# Patient Record
Sex: MALE | Race: WHITE | URBAN - METROPOLITAN AREA
[De-identification: names, ages, dates, MRNs, and addresses within clinical notes are randomized per-mention and may not be internally consistent; named-entity substitution may affect disease eponyms.]

---

## 2022-01-01 ENCOUNTER — APPOINTMENT (OUTPATIENT)
Dept: GENERAL RADIOLOGY | Facility: CLINIC | Age: 67
DRG: 003 | End: 2022-01-01
Attending: STUDENT IN AN ORGANIZED HEALTH CARE EDUCATION/TRAINING PROGRAM
Payer: COMMERCIAL

## 2022-01-01 ENCOUNTER — APPOINTMENT (OUTPATIENT)
Dept: CT IMAGING | Facility: CLINIC | Age: 67
DRG: 003 | End: 2022-01-01
Attending: PHYSICIAN ASSISTANT
Payer: COMMERCIAL

## 2022-01-01 ENCOUNTER — APPOINTMENT (OUTPATIENT)
Dept: ULTRASOUND IMAGING | Facility: CLINIC | Age: 67
DRG: 003 | End: 2022-01-01
Attending: STUDENT IN AN ORGANIZED HEALTH CARE EDUCATION/TRAINING PROGRAM
Payer: COMMERCIAL

## 2022-01-01 ENCOUNTER — HOSPITAL ENCOUNTER (INPATIENT)
Facility: CLINIC | Age: 67
LOS: 2 days | DRG: 003 | End: 2022-03-18
Attending: FAMILY MEDICINE | Admitting: INTERNAL MEDICINE
Payer: COMMERCIAL

## 2022-01-01 ENCOUNTER — APPOINTMENT (OUTPATIENT)
Dept: CT IMAGING | Facility: CLINIC | Age: 67
DRG: 003 | End: 2022-01-01
Attending: STUDENT IN AN ORGANIZED HEALTH CARE EDUCATION/TRAINING PROGRAM
Payer: COMMERCIAL

## 2022-01-01 ENCOUNTER — APPOINTMENT (OUTPATIENT)
Dept: ULTRASOUND IMAGING | Facility: CLINIC | Age: 67
DRG: 003 | End: 2022-01-01
Attending: NURSE PRACTITIONER
Payer: COMMERCIAL

## 2022-01-01 ENCOUNTER — ANESTHESIA EVENT (OUTPATIENT)
Dept: CARDIOLOGY | Facility: CLINIC | Age: 67
DRG: 003 | End: 2022-01-01
Payer: COMMERCIAL

## 2022-01-01 ENCOUNTER — APPOINTMENT (OUTPATIENT)
Dept: CARDIOLOGY | Facility: CLINIC | Age: 67
DRG: 003 | End: 2022-01-01
Attending: STUDENT IN AN ORGANIZED HEALTH CARE EDUCATION/TRAINING PROGRAM
Payer: COMMERCIAL

## 2022-01-01 ENCOUNTER — ANESTHESIA (OUTPATIENT)
Dept: CARDIOLOGY | Facility: CLINIC | Age: 67
DRG: 003 | End: 2022-01-01
Payer: COMMERCIAL

## 2022-01-01 ENCOUNTER — APPOINTMENT (OUTPATIENT)
Dept: NEUROLOGY | Facility: CLINIC | Age: 67
DRG: 003 | End: 2022-01-01
Attending: STUDENT IN AN ORGANIZED HEALTH CARE EDUCATION/TRAINING PROGRAM
Payer: COMMERCIAL

## 2022-01-01 VITALS
BODY MASS INDEX: 39.14 KG/M2 | OXYGEN SATURATION: 100 % | HEIGHT: 70 IN | WEIGHT: 273.37 LBS | RESPIRATION RATE: 18 BRPM | TEMPERATURE: 99.5 F

## 2022-01-01 DIAGNOSIS — I46.9 CARDIAC ARREST (H): ICD-10-CM

## 2022-01-01 LAB
ABO/RH(D): NORMAL
ABO/RH(D): NORMAL
ACT BLD: 165 SECONDS (ref 74–150)
ACT BLD: 173 SECONDS (ref 74–150)
ACT BLD: 177 SECONDS (ref 74–150)
ACT BLD: 177 SECONDS (ref 74–150)
ACT BLD: 186 SECONDS (ref 74–150)
ACT BLD: 194 SECONDS (ref 74–150)
ACT BLD: 199 SECONDS (ref 74–150)
ACT BLD: 203 SECONDS (ref 74–150)
ACT BLD: 207 SECONDS (ref 74–150)
ACT BLD: 207 SECONDS (ref 74–150)
ACT BLD: 211 SECONDS (ref 74–150)
ACT BLD: 216 SECONDS (ref 74–150)
ACT BLD: 224 SECONDS (ref 74–150)
ACT BLD: 237 SECONDS (ref 74–150)
ACT BLD: 237 SECONDS (ref 74–150)
ALBUMIN SERPL-MCNC: 2.8 G/DL (ref 3.4–5)
ALBUMIN SERPL-MCNC: 2.9 G/DL (ref 3.4–5)
ALBUMIN SERPL-MCNC: 2.9 G/DL (ref 3.4–5)
ALBUMIN SERPL-MCNC: 3 G/DL (ref 3.4–5)
ALBUMIN SERPL-MCNC: 3.2 G/DL (ref 3.4–5)
ALBUMIN SERPL-MCNC: 3.3 G/DL (ref 3.4–5)
ALBUMIN SERPL-MCNC: 3.3 G/DL (ref 3.4–5)
ALBUMIN SERPL-MCNC: 3.5 G/DL (ref 3.4–5)
ALBUMIN SERPL-MCNC: 3.5 G/DL (ref 3.4–5)
ALBUMIN SERPL-MCNC: 3.6 G/DL (ref 3.4–5)
ALBUMIN UR-MCNC: 50 MG/DL
ALP SERPL-CCNC: 118 U/L (ref 40–150)
ALP SERPL-CCNC: 136 U/L (ref 40–150)
ALP SERPL-CCNC: 151 U/L (ref 40–150)
ALP SERPL-CCNC: 68 U/L (ref 40–150)
ALP SERPL-CCNC: 71 U/L (ref 40–150)
ALP SERPL-CCNC: 72 U/L (ref 40–150)
ALP SERPL-CCNC: 77 U/L (ref 40–150)
ALP SERPL-CCNC: 93 U/L (ref 40–150)
ALT SERPL W P-5'-P-CCNC: 290 U/L (ref 0–70)
ALT SERPL W P-5'-P-CCNC: 290 U/L (ref 0–70)
ALT SERPL W P-5'-P-CCNC: 2912 U/L (ref 0–70)
ALT SERPL W P-5'-P-CCNC: 310 U/L (ref 0–70)
ALT SERPL W P-5'-P-CCNC: 367 U/L (ref 0–70)
ALT SERPL W P-5'-P-CCNC: 470 U/L (ref 0–70)
ALT SERPL W P-5'-P-CCNC: 6201 U/L (ref 0–70)
ALT SERPL W P-5'-P-CCNC: 952 U/L (ref 0–70)
AMPHETAMINES UR QL SCN: NORMAL
AMYLASE SERPL-CCNC: 209 U/L (ref 30–110)
ANION GAP SERPL CALCULATED.3IONS-SCNC: 11 MMOL/L (ref 3–14)
ANION GAP SERPL CALCULATED.3IONS-SCNC: 11 MMOL/L (ref 3–14)
ANION GAP SERPL CALCULATED.3IONS-SCNC: 13 MMOL/L (ref 3–14)
ANION GAP SERPL CALCULATED.3IONS-SCNC: 15 MMOL/L (ref 3–14)
ANION GAP SERPL CALCULATED.3IONS-SCNC: 16 MMOL/L (ref 3–14)
ANION GAP SERPL CALCULATED.3IONS-SCNC: 16 MMOL/L (ref 3–14)
ANION GAP SERPL CALCULATED.3IONS-SCNC: 17 MMOL/L (ref 3–14)
ANION GAP SERPL CALCULATED.3IONS-SCNC: 22 MMOL/L (ref 3–14)
ANION GAP SERPL CALCULATED.3IONS-SCNC: 24 MMOL/L (ref 3–14)
ANION GAP SERPL CALCULATED.3IONS-SCNC: 8 MMOL/L (ref 3–14)
ANTIBODY SCREEN: NEGATIVE
ANTIBODY SCREEN: NEGATIVE
APPEARANCE UR: ABNORMAL
APTT PPP: 105 SECONDS (ref 22–38)
APTT PPP: 113 SECONDS (ref 22–38)
APTT PPP: 158 SECONDS (ref 22–38)
APTT PPP: 48 SECONDS (ref 22–38)
APTT PPP: 55 SECONDS (ref 22–38)
APTT PPP: 63 SECONDS (ref 22–38)
APTT PPP: 79 SECONDS (ref 22–38)
APTT PPP: 90 SECONDS (ref 22–38)
AST SERPL W P-5'-P-CCNC: 1579 U/L (ref 0–45)
AST SERPL W P-5'-P-CCNC: 3927 U/L (ref 0–45)
AST SERPL W P-5'-P-CCNC: 693 U/L (ref 0–45)
AST SERPL W P-5'-P-CCNC: 694 U/L (ref 0–45)
AST SERPL W P-5'-P-CCNC: 713 U/L (ref 0–45)
AST SERPL W P-5'-P-CCNC: 752 U/L (ref 0–45)
AST SERPL W P-5'-P-CCNC: 797 U/L (ref 0–45)
AST SERPL W P-5'-P-CCNC: 9096 U/L (ref 0–45)
AT III ACT/NOR PPP CHRO: 56 % (ref 85–135)
AT III ACT/NOR PPP CHRO: 81 % (ref 85–135)
AT III ACT/NOR PPP CHRO: 95 % (ref 85–135)
ATRIAL RATE - MUSE: 70 BPM
BACTERIA UR CULT: NO GROWTH
BARBITURATES UR QL: NORMAL
BASE EXCESS BLDA CALC-SCNC: -10.2 MMOL/L (ref -9–1.8)
BASE EXCESS BLDA CALC-SCNC: -10.3 MMOL/L (ref -9–1.8)
BASE EXCESS BLDA CALC-SCNC: -10.4 MMOL/L (ref -9–1.8)
BASE EXCESS BLDA CALC-SCNC: -10.7 MMOL/L (ref -9.6–2)
BASE EXCESS BLDA CALC-SCNC: -10.8 MMOL/L (ref -9–1.8)
BASE EXCESS BLDA CALC-SCNC: -10.8 MMOL/L (ref -9–1.8)
BASE EXCESS BLDA CALC-SCNC: -11.4 MMOL/L (ref -9–1.8)
BASE EXCESS BLDA CALC-SCNC: -11.5 MMOL/L (ref -9.6–2)
BASE EXCESS BLDA CALC-SCNC: -12.3 MMOL/L (ref -9–1.8)
BASE EXCESS BLDA CALC-SCNC: -12.3 MMOL/L (ref -9–1.8)
BASE EXCESS BLDA CALC-SCNC: -12.5 MMOL/L (ref -9–1.8)
BASE EXCESS BLDA CALC-SCNC: -12.6 MMOL/L (ref -9–1.8)
BASE EXCESS BLDA CALC-SCNC: -12.6 MMOL/L (ref -9–1.8)
BASE EXCESS BLDA CALC-SCNC: -12.7 MMOL/L (ref -9–1.8)
BASE EXCESS BLDA CALC-SCNC: -12.8 MMOL/L (ref -9–1.8)
BASE EXCESS BLDA CALC-SCNC: -13 MMOL/L (ref -9–1.8)
BASE EXCESS BLDA CALC-SCNC: -13.3 MMOL/L (ref -9–1.8)
BASE EXCESS BLDA CALC-SCNC: -13.3 MMOL/L (ref -9–1.8)
BASE EXCESS BLDA CALC-SCNC: -13.9 MMOL/L (ref -9–1.8)
BASE EXCESS BLDA CALC-SCNC: -14 MMOL/L (ref -9–1.8)
BASE EXCESS BLDA CALC-SCNC: -14 MMOL/L (ref -9–1.8)
BASE EXCESS BLDA CALC-SCNC: -14.2 MMOL/L (ref -9–1.8)
BASE EXCESS BLDA CALC-SCNC: -14.2 MMOL/L (ref -9–1.8)
BASE EXCESS BLDA CALC-SCNC: -15.4 MMOL/L (ref -9–1.8)
BASE EXCESS BLDA CALC-SCNC: -16.2 MMOL/L (ref -9.6–2)
BASE EXCESS BLDA CALC-SCNC: -16.7 MMOL/L (ref -9–1.8)
BASE EXCESS BLDA CALC-SCNC: -17 MMOL/L (ref -9.6–2)
BASE EXCESS BLDA CALC-SCNC: -17.9 MMOL/L (ref -9–1.8)
BASE EXCESS BLDA CALC-SCNC: -18.5 MMOL/L (ref -9–1.8)
BASE EXCESS BLDA CALC-SCNC: -8.3 MMOL/L (ref -9–1.8)
BASE EXCESS BLDV CALC-SCNC: -12.2 MMOL/L (ref -7.7–1.9)
BASE EXCESS BLDV CALC-SCNC: -13.2 MMOL/L (ref -7.7–1.9)
BASE EXCESS BLDV CALC-SCNC: -9.2 MMOL/L (ref -7.7–1.9)
BASOPHILS # BLD AUTO: 0.1 10E3/UL (ref 0–0.2)
BASOPHILS NFR BLD AUTO: 0 %
BENZODIAZ UR QL: NORMAL
BILIRUB DIRECT SERPL-MCNC: 0.8 MG/DL (ref 0–0.2)
BILIRUB SERPL-MCNC: 0.7 MG/DL (ref 0.2–1.3)
BILIRUB SERPL-MCNC: 0.9 MG/DL (ref 0.2–1.3)
BILIRUB SERPL-MCNC: 1 MG/DL (ref 0.2–1.3)
BILIRUB SERPL-MCNC: 1.1 MG/DL (ref 0.2–1.3)
BILIRUB SERPL-MCNC: 1.1 MG/DL (ref 0.2–1.3)
BILIRUB SERPL-MCNC: 1.6 MG/DL (ref 0.2–1.3)
BILIRUB SERPL-MCNC: 1.6 MG/DL (ref 0.2–1.3)
BILIRUB SERPL-MCNC: 1.9 MG/DL (ref 0.2–1.3)
BILIRUB UR QL STRIP: NEGATIVE
BLD PROD TYP BPU: NORMAL
BLOOD COMPONENT TYPE: NORMAL
BUN SERPL-MCNC: 28 MG/DL (ref 7–30)
BUN SERPL-MCNC: 32 MG/DL (ref 7–30)
BUN SERPL-MCNC: 32 MG/DL (ref 7–30)
BUN SERPL-MCNC: 36 MG/DL (ref 7–30)
BUN SERPL-MCNC: 38 MG/DL (ref 7–30)
BUN SERPL-MCNC: 39 MG/DL (ref 7–30)
BUN SERPL-MCNC: 42 MG/DL (ref 7–30)
BUN SERPL-MCNC: 43 MG/DL (ref 7–30)
BUN SERPL-MCNC: 43 MG/DL (ref 7–30)
BUN SERPL-MCNC: 46 MG/DL (ref 7–30)
CA-I BLD-MCNC: 3.3 MG/DL (ref 4.4–5.2)
CA-I BLD-MCNC: 3.9 MG/DL (ref 4.4–5.2)
CA-I BLD-MCNC: 3.9 MG/DL (ref 4.4–5.2)
CA-I BLD-MCNC: 4 MG/DL (ref 4.4–5.2)
CA-I BLD-MCNC: 4.1 MG/DL (ref 4.4–5.2)
CA-I BLD-MCNC: 4.2 MG/DL (ref 4.4–5.2)
CA-I BLD-MCNC: 4.2 MG/DL (ref 4.4–5.2)
CA-I BLD-MCNC: 4.3 MG/DL (ref 4.4–5.2)
CA-I BLD-MCNC: 4.4 MG/DL (ref 4.4–5.2)
CA-I BLD-MCNC: 4.4 MG/DL (ref 4.4–5.2)
CA-I BLD-MCNC: 4.5 MG/DL (ref 4.4–5.2)
CA-I BLD-MCNC: 4.5 MG/DL (ref 4.4–5.2)
CA-I BLD-MCNC: 4.8 MG/DL (ref 4.4–5.2)
CA-I BLD-MCNC: 4.8 MG/DL (ref 4.4–5.2)
CALCIUM SERPL-MCNC: 7.1 MG/DL (ref 8.5–10.1)
CALCIUM SERPL-MCNC: 7.1 MG/DL (ref 8.5–10.1)
CALCIUM SERPL-MCNC: 7.4 MG/DL (ref 8.5–10.1)
CALCIUM SERPL-MCNC: 7.4 MG/DL (ref 8.5–10.1)
CALCIUM SERPL-MCNC: 7.5 MG/DL (ref 8.5–10.1)
CALCIUM SERPL-MCNC: 7.5 MG/DL (ref 8.5–10.1)
CALCIUM SERPL-MCNC: 8 MG/DL (ref 8.5–10.1)
CALCIUM SERPL-MCNC: 8.2 MG/DL (ref 8.5–10.1)
CALCIUM SERPL-MCNC: 8.2 MG/DL (ref 8.5–10.1)
CALCIUM SERPL-MCNC: 8.8 MG/DL (ref 8.5–10.1)
CANNABINOIDS UR QL SCN: NORMAL
CF REDUC 30M P MA P HEP LENFR BLD TEG: 0 % (ref 0–8)
CF REDUC 60M P MA P HEPASE LENFR BLD TEG: 0 % (ref 0–15)
CFT P HPASE BLD TEG: 1.6 MINUTE (ref 1–3)
CFT P HPASE BLD TEG: 2.1 MINUTE (ref 1–3)
CFT P HPASE BLD TEG: 2.2 MINUTE (ref 1–3)
CHLORIDE BLD-SCNC: 104 MMOL/L (ref 94–109)
CHLORIDE BLD-SCNC: 105 MMOL/L (ref 94–109)
CHLORIDE BLD-SCNC: 108 MMOL/L (ref 94–109)
CHLORIDE BLD-SCNC: 110 MMOL/L (ref 94–109)
CHLORIDE BLD-SCNC: 112 MMOL/L (ref 94–109)
CHLORIDE BLD-SCNC: 113 MMOL/L (ref 94–109)
CHLORIDE BLD-SCNC: 114 MMOL/L (ref 94–109)
CHLORIDE BLD-SCNC: 115 MMOL/L (ref 94–109)
CHLORIDE BLD-SCNC: 116 MMOL/L (ref 94–109)
CHLORIDE BLD-SCNC: 116 MMOL/L (ref 94–109)
CI (COAGULATION INDEX)(Z): -0.5 (ref -3–3)
CI (COAGULATION INDEX)(Z): -1.5 (ref -3–3)
CI (COAGULATION INDEX)(Z): -3.5 (ref -3–3)
CLOT ANGLE P HPASE BLD TEG: 60.3 DEGREES (ref 53–72)
CLOT ANGLE P HPASE BLD TEG: 60.5 DEGREES (ref 53–72)
CLOT ANGLE P HPASE BLD TEG: 66.8 DEGREES (ref 53–72)
CLOT INIT P HPASE BLD TEG: 11.5 MINUTE (ref 5–10)
CLOT INIT P HPASE BLD TEG: 8.1 MINUTE (ref 5–10)
CLOT INIT P HPASE BLD TEG: 8.8 MINUTE (ref 5–10)
CLOT STRENGTH P HPASE BLD TEG: 10.4 KD/SC (ref 4.5–11)
CLOT STRENGTH P HPASE BLD TEG: 9.5 KD/SC (ref 4.5–11)
CLOT STRENGTH P HPASE BLD TEG: 9.9 KD/SC (ref 4.5–11)
CO2 SERPL-SCNC: 12 MMOL/L (ref 20–32)
CO2 SERPL-SCNC: 13 MMOL/L (ref 20–32)
CO2 SERPL-SCNC: 15 MMOL/L (ref 20–32)
CO2 SERPL-SCNC: 16 MMOL/L (ref 20–32)
CO2 SERPL-SCNC: 16 MMOL/L (ref 20–32)
CO2 SERPL-SCNC: 17 MMOL/L (ref 20–32)
CO2 SERPL-SCNC: 19 MMOL/L (ref 20–32)
CO2 SERPL-SCNC: 19 MMOL/L (ref 20–32)
COCAINE UR QL: NORMAL
CODING SYSTEM: NORMAL
COLOR UR AUTO: ABNORMAL
CORTIS SERPL-MCNC: 42.1 UG/DL (ref 4–22)
CPB POCT: NO
CREAT SERPL-MCNC: 1.42 MG/DL (ref 0.66–1.25)
CREAT SERPL-MCNC: 1.59 MG/DL (ref 0.66–1.25)
CREAT SERPL-MCNC: 1.9 MG/DL (ref 0.66–1.25)
CREAT SERPL-MCNC: 2.09 MG/DL (ref 0.66–1.25)
CREAT SERPL-MCNC: 2.11 MG/DL (ref 0.66–1.25)
CREAT SERPL-MCNC: 2.16 MG/DL (ref 0.66–1.25)
CREAT SERPL-MCNC: 2.27 MG/DL (ref 0.66–1.25)
CREAT SERPL-MCNC: 2.29 MG/DL (ref 0.66–1.25)
CREAT SERPL-MCNC: 2.44 MG/DL (ref 0.66–1.25)
CREAT SERPL-MCNC: 2.53 MG/DL (ref 0.66–1.25)
CROSSMATCH: NORMAL
CRP SERPL-MCNC: 130 MG/L (ref 0–8)
CRP SERPL-MCNC: 3 MG/L (ref 0–8)
CRP SERPL-MCNC: <2.9 MG/L (ref 0–8)
D DIMER PPP FEU-MCNC: 3.25 UG/ML FEU (ref 0–0.5)
D DIMER PPP FEU-MCNC: 3.78 UG/ML FEU (ref 0–0.5)
D DIMER PPP FEU-MCNC: 5.69 UG/ML FEU (ref 0–0.5)
D DIMER PPP FEU-MCNC: 7.66 UG/ML FEU (ref 0–0.5)
DIASTOLIC BLOOD PRESSURE - MUSE: NORMAL MMHG
EOSINOPHIL # BLD AUTO: 0.1 10E3/UL (ref 0–0.7)
EOSINOPHIL NFR BLD AUTO: 1 %
ERYTHROCYTE [DISTWIDTH] IN BLOOD BY AUTOMATED COUNT: 13.7 % (ref 10–15)
ERYTHROCYTE [DISTWIDTH] IN BLOOD BY AUTOMATED COUNT: 13.8 % (ref 10–15)
ERYTHROCYTE [DISTWIDTH] IN BLOOD BY AUTOMATED COUNT: 13.9 % (ref 10–15)
ERYTHROCYTE [DISTWIDTH] IN BLOOD BY AUTOMATED COUNT: 14 % (ref 10–15)
ERYTHROCYTE [DISTWIDTH] IN BLOOD BY AUTOMATED COUNT: 14 % (ref 10–15)
ERYTHROCYTE [DISTWIDTH] IN BLOOD BY AUTOMATED COUNT: 14.1 % (ref 10–15)
ERYTHROCYTE [DISTWIDTH] IN BLOOD BY AUTOMATED COUNT: 14.1 % (ref 10–15)
ERYTHROCYTE [DISTWIDTH] IN BLOOD BY AUTOMATED COUNT: 14.3 % (ref 10–15)
ERYTHROCYTE [DISTWIDTH] IN BLOOD BY AUTOMATED COUNT: 14.3 % (ref 10–15)
ERYTHROCYTE [SEDIMENTATION RATE] IN BLOOD BY WESTERGREN METHOD: 5 MM/HR (ref 0–20)
ERYTHROCYTE [SEDIMENTATION RATE] IN BLOOD BY WESTERGREN METHOD: 6 MM/HR (ref 0–20)
ERYTHROCYTE [SEDIMENTATION RATE] IN BLOOD BY WESTERGREN METHOD: 8 MM/HR (ref 0–20)
ETHANOL UR QL SCN: NORMAL
FERRITIN SERPL-MCNC: ABNORMAL NG/ML (ref 26–388)
FIBRINOGEN PPP-MCNC: 265 MG/DL (ref 170–490)
FIBRINOGEN PPP-MCNC: 271 MG/DL (ref 170–490)
FIBRINOGEN PPP-MCNC: 282 MG/DL (ref 170–490)
FIBRINOGEN PPP-MCNC: 323 MG/DL (ref 170–490)
FLUAV RNA SPEC QL NAA+PROBE: NEGATIVE
FLUBV RNA RESP QL NAA+PROBE: NEGATIVE
GFR SERPL CREATININE-BSD FRML MDRD: 27 ML/MIN/1.73M2
GFR SERPL CREATININE-BSD FRML MDRD: 28 ML/MIN/1.73M2
GFR SERPL CREATININE-BSD FRML MDRD: 30 ML/MIN/1.73M2
GFR SERPL CREATININE-BSD FRML MDRD: 31 ML/MIN/1.73M2
GFR SERPL CREATININE-BSD FRML MDRD: 33 ML/MIN/1.73M2
GFR SERPL CREATININE-BSD FRML MDRD: 33 ML/MIN/1.73M2
GFR SERPL CREATININE-BSD FRML MDRD: 34 ML/MIN/1.73M2
GFR SERPL CREATININE-BSD FRML MDRD: 38 ML/MIN/1.73M2
GFR SERPL CREATININE-BSD FRML MDRD: 47 ML/MIN/1.73M2
GFR SERPL CREATININE-BSD FRML MDRD: 54 ML/MIN/1.73M2
GLUCOSE BLD-MCNC: 103 MG/DL (ref 70–99)
GLUCOSE BLD-MCNC: 105 MG/DL (ref 70–99)
GLUCOSE BLD-MCNC: 112 MG/DL (ref 70–99)
GLUCOSE BLD-MCNC: 117 MG/DL (ref 70–99)
GLUCOSE BLD-MCNC: 119 MG/DL (ref 70–99)
GLUCOSE BLD-MCNC: 119 MG/DL (ref 70–99)
GLUCOSE BLD-MCNC: 128 MG/DL (ref 70–99)
GLUCOSE BLD-MCNC: 134 MG/DL (ref 70–99)
GLUCOSE BLD-MCNC: 170 MG/DL (ref 70–99)
GLUCOSE BLD-MCNC: 178 MG/DL (ref 70–99)
GLUCOSE BLD-MCNC: 207 MG/DL (ref 70–99)
GLUCOSE BLD-MCNC: 22 MG/DL (ref 70–99)
GLUCOSE BLD-MCNC: 230 MG/DL (ref 70–99)
GLUCOSE BLD-MCNC: 265 MG/DL (ref 70–99)
GLUCOSE BLD-MCNC: 278 MG/DL (ref 70–99)
GLUCOSE BLD-MCNC: 336 MG/DL (ref 70–99)
GLUCOSE BLD-MCNC: 58 MG/DL (ref 70–99)
GLUCOSE BLD-MCNC: 59 MG/DL (ref 70–99)
GLUCOSE BLD-MCNC: 63 MG/DL (ref 70–99)
GLUCOSE BLD-MCNC: 67 MG/DL (ref 70–99)
GLUCOSE BLD-MCNC: 82 MG/DL (ref 70–99)
GLUCOSE BLD-MCNC: 98 MG/DL (ref 70–99)
GLUCOSE BLDC GLUCOMTR-MCNC: 104 MG/DL (ref 70–99)
GLUCOSE BLDC GLUCOMTR-MCNC: 120 MG/DL (ref 70–99)
GLUCOSE BLDC GLUCOMTR-MCNC: 121 MG/DL (ref 70–99)
GLUCOSE BLDC GLUCOMTR-MCNC: 129 MG/DL (ref 70–99)
GLUCOSE BLDC GLUCOMTR-MCNC: 135 MG/DL (ref 70–99)
GLUCOSE BLDC GLUCOMTR-MCNC: 136 MG/DL (ref 70–99)
GLUCOSE BLDC GLUCOMTR-MCNC: 138 MG/DL (ref 70–99)
GLUCOSE BLDC GLUCOMTR-MCNC: 141 MG/DL (ref 70–99)
GLUCOSE BLDC GLUCOMTR-MCNC: 17 MG/DL (ref 70–99)
GLUCOSE BLDC GLUCOMTR-MCNC: 178 MG/DL (ref 70–99)
GLUCOSE BLDC GLUCOMTR-MCNC: 18 MG/DL (ref 70–99)
GLUCOSE BLDC GLUCOMTR-MCNC: 216 MG/DL (ref 70–99)
GLUCOSE BLDC GLUCOMTR-MCNC: 60 MG/DL (ref 70–99)
GLUCOSE BLDC GLUCOMTR-MCNC: 66 MG/DL (ref 70–99)
GLUCOSE BLDC GLUCOMTR-MCNC: 72 MG/DL (ref 70–99)
GLUCOSE BLDC GLUCOMTR-MCNC: 73 MG/DL (ref 70–99)
GLUCOSE BLDC GLUCOMTR-MCNC: 75 MG/DL (ref 70–99)
GLUCOSE BLDC GLUCOMTR-MCNC: 76 MG/DL (ref 70–99)
GLUCOSE BLDC GLUCOMTR-MCNC: 83 MG/DL (ref 70–99)
GLUCOSE BLDC GLUCOMTR-MCNC: 83 MG/DL (ref 70–99)
GLUCOSE BLDC GLUCOMTR-MCNC: 92 MG/DL (ref 70–99)
GLUCOSE BLDC GLUCOMTR-MCNC: 99 MG/DL (ref 70–99)
GLUCOSE UR STRIP-MCNC: NEGATIVE MG/DL
HBA1C MFR BLD: 5.9 % (ref 0–5.6)
HCO3 BLD-SCNC: 10 MMOL/L (ref 21–28)
HCO3 BLD-SCNC: 10 MMOL/L (ref 21–28)
HCO3 BLD-SCNC: 12 MMOL/L (ref 21–28)
HCO3 BLD-SCNC: 13 MMOL/L (ref 21–28)
HCO3 BLD-SCNC: 14 MMOL/L (ref 21–28)
HCO3 BLD-SCNC: 15 MMOL/L (ref 21–28)
HCO3 BLD-SCNC: 16 MMOL/L (ref 21–28)
HCO3 BLD-SCNC: 17 MMOL/L (ref 21–28)
HCO3 BLD-SCNC: 17 MMOL/L (ref 21–28)
HCO3 BLD-SCNC: 19 MMOL/L (ref 21–28)
HCO3 BLD-SCNC: 20 MMOL/L (ref 21–28)
HCO3 BLDA-SCNC: 13 MMOL/L (ref 21–28)
HCO3 BLDA-SCNC: 14 MMOL/L (ref 21–28)
HCO3 BLDA-SCNC: 15 MMOL/L (ref 21–28)
HCO3 BLDA-SCNC: 15 MMOL/L (ref 21–28)
HCO3 BLDA-SCNC: 17 MMOL/L (ref 21–28)
HCO3 BLDA-SCNC: 18 MMOL/L (ref 21–28)
HCO3 BLDA-SCNC: 19 MMOL/L (ref 21–28)
HCO3 BLDV-SCNC: 16 MMOL/L (ref 21–28)
HCO3 BLDV-SCNC: 17 MMOL/L (ref 21–28)
HCO3 BLDV-SCNC: 21 MMOL/L (ref 21–28)
HCO3 BLDV-SCNC: 24 MMOL/L (ref 21–28)
HCO3 BLDV-SCNC: 25 MMOL/L (ref 21–28)
HCT VFR BLD AUTO: 24.7 % (ref 40–53)
HCT VFR BLD AUTO: 28.4 % (ref 40–53)
HCT VFR BLD AUTO: 34.2 % (ref 40–53)
HCT VFR BLD AUTO: 34.2 % (ref 40–53)
HCT VFR BLD AUTO: 36.1 % (ref 40–53)
HCT VFR BLD AUTO: 36.7 % (ref 40–53)
HCT VFR BLD AUTO: 36.8 % (ref 40–53)
HCT VFR BLD AUTO: 41.1 % (ref 40–53)
HCT VFR BLD AUTO: 47.5 % (ref 40–53)
HCT VFR BLD CALC: 37 % (ref 40–53)
HGB BLD-MCNC: 10.4 G/DL (ref 13.3–17.7)
HGB BLD-MCNC: 10.4 G/DL (ref 13.3–17.7)
HGB BLD-MCNC: 10.6 G/DL (ref 13.3–17.7)
HGB BLD-MCNC: 11.4 G/DL (ref 13.3–17.7)
HGB BLD-MCNC: 11.8 G/DL (ref 13.3–17.7)
HGB BLD-MCNC: 12.6 G/DL (ref 13.3–17.7)
HGB BLD-MCNC: 12.8 G/DL (ref 13.3–17.7)
HGB BLD-MCNC: 13.1 G/DL (ref 13.3–17.7)
HGB BLD-MCNC: 14.4 G/DL (ref 13.3–17.7)
HGB BLD-MCNC: 15.2 G/DL (ref 13.3–17.7)
HGB BLD-MCNC: 7.3 G/DL (ref 13.3–17.7)
HGB BLD-MCNC: 8.5 G/DL (ref 13.3–17.7)
HGB FREE PLAS-MCNC: 30 MG/DL
HGB FREE PLAS-MCNC: 30 MG/DL
HGB FREE PLAS-MCNC: 50 MG/DL
HGB UR QL STRIP: ABNORMAL
HOLD SPECIMEN: NORMAL
HYALINE CASTS: 13 /LPF
IMM GRANULOCYTES # BLD: 0.2 10E3/UL
IMM GRANULOCYTES NFR BLD: 1 %
INR PPP: 1.06 (ref 0.85–1.15)
INR PPP: 1.22 (ref 0.85–1.15)
INR PPP: 1.28 (ref 0.85–1.15)
INR PPP: 1.44 (ref 0.85–1.15)
INR PPP: 1.46 (ref 0.85–1.15)
INR PPP: 1.52 (ref 0.85–1.15)
INR PPP: 1.8 (ref 0.85–1.15)
INR PPP: 2.78 (ref 0.85–1.15)
INTERPRETATION ECG - MUSE: NORMAL
INTERPRETATION TEGPIA: NORMAL
INTERPRETATION TEGPIA: NORMAL
ISSUE DATE AND TIME: NORMAL
KETONES BLD-SCNC: 0.2 MMOL/L (ref 0–0.6)
KETONES UR STRIP-MCNC: NEGATIVE MG/DL
LACTATE BLD-SCNC: 7.4 MMOL/L
LACTATE BLD-SCNC: 7.5 MMOL/L
LACTATE BLD-SCNC: 8.7 MMOL/L
LACTATE BLD-SCNC: 8.9 MMOL/L
LACTATE BLD-SCNC: 9.3 MMOL/L
LACTATE SERPL-SCNC: 11 MMOL/L (ref 0.7–2)
LACTATE SERPL-SCNC: 12.3 MMOL/L (ref 0.7–2)
LACTATE SERPL-SCNC: 13.4 MMOL/L (ref 0.7–2)
LACTATE SERPL-SCNC: 14.2 MMOL/L (ref 0.7–2)
LACTATE SERPL-SCNC: 17 MMOL/L (ref 0.7–2)
LACTATE SERPL-SCNC: 20 MMOL/L (ref 0.7–2)
LACTATE SERPL-SCNC: 21 MMOL/L (ref 0.7–2)
LACTATE SERPL-SCNC: 3.2 MMOL/L (ref 0.7–2)
LACTATE SERPL-SCNC: 3.9 MMOL/L (ref 0.7–2)
LACTATE SERPL-SCNC: 5.7 MMOL/L (ref 0.7–2)
LACTATE SERPL-SCNC: 6 MMOL/L (ref 0.7–2)
LACTATE SERPL-SCNC: 6.6 MMOL/L (ref 0.7–2)
LACTATE SERPL-SCNC: 7.4 MMOL/L (ref 0.7–2)
LACTATE SERPL-SCNC: 7.9 MMOL/L (ref 0.7–2)
LACTATE SERPL-SCNC: 8 MMOL/L (ref 0.7–2)
LACTATE SERPL-SCNC: 9.2 MMOL/L (ref 0.7–2)
LACTATE SERPL-SCNC: 9.7 MMOL/L (ref 0.7–2)
LACTATE SERPL-SCNC: NORMAL MMOL/L
LDH SERPL L TO P-CCNC: 1061 U/L (ref 85–227)
LDH SERPL L TO P-CCNC: 3509 U/L (ref 85–227)
LDH SERPL L TO P-CCNC: 934 U/L (ref 85–227)
LEUKOCYTE ESTERASE UR QL STRIP: NEGATIVE
LIPASE SERPL-CCNC: 238 U/L (ref 73–393)
LYMPHOCYTES # BLD AUTO: 2.1 10E3/UL (ref 0.8–5.3)
LYMPHOCYTES NFR BLD AUTO: 12 %
MAGNESIUM SERPL-MCNC: 2 MG/DL (ref 1.6–2.3)
MAGNESIUM SERPL-MCNC: 2 MG/DL (ref 1.6–2.3)
MAGNESIUM SERPL-MCNC: 2.1 MG/DL (ref 1.6–2.3)
MAGNESIUM SERPL-MCNC: 2.2 MG/DL (ref 1.6–2.3)
MAGNESIUM SERPL-MCNC: 2.3 MG/DL (ref 1.6–2.3)
MAGNESIUM SERPL-MCNC: 2.4 MG/DL (ref 1.6–2.3)
MAGNESIUM SERPL-MCNC: 2.5 MG/DL (ref 1.6–2.3)
MAGNESIUM SERPL-MCNC: 2.7 MG/DL (ref 1.6–2.3)
MAGNESIUM SERPL-MCNC: 2.8 MG/DL (ref 1.6–2.3)
MCF P HPASE BLD TEG: 65.5 MM (ref 50–70)
MCF P HPASE BLD TEG: 66.4 MM (ref 50–70)
MCF P HPASE BLD TEG: 67.6 MM (ref 50–70)
MCH RBC QN AUTO: 30.1 PG (ref 26.5–33)
MCH RBC QN AUTO: 30.4 PG (ref 26.5–33)
MCH RBC QN AUTO: 30.5 PG (ref 26.5–33)
MCH RBC QN AUTO: 30.6 PG (ref 26.5–33)
MCH RBC QN AUTO: 30.8 PG (ref 26.5–33)
MCH RBC QN AUTO: 30.8 PG (ref 26.5–33)
MCH RBC QN AUTO: 30.9 PG (ref 26.5–33)
MCHC RBC AUTO-ENTMCNC: 29.6 G/DL (ref 31.5–36.5)
MCHC RBC AUTO-ENTMCNC: 29.9 G/DL (ref 31.5–36.5)
MCHC RBC AUTO-ENTMCNC: 30.4 G/DL (ref 31.5–36.5)
MCHC RBC AUTO-ENTMCNC: 31 G/DL (ref 31.5–36.5)
MCHC RBC AUTO-ENTMCNC: 31 G/DL (ref 31.5–36.5)
MCHC RBC AUTO-ENTMCNC: 31.1 G/DL (ref 31.5–36.5)
MCHC RBC AUTO-ENTMCNC: 31.1 G/DL (ref 31.5–36.5)
MCHC RBC AUTO-ENTMCNC: 31.6 G/DL (ref 31.5–36.5)
MCHC RBC AUTO-ENTMCNC: 32 G/DL (ref 31.5–36.5)
MCV RBC AUTO: 101 FL (ref 78–100)
MCV RBC AUTO: 104 FL (ref 78–100)
MCV RBC AUTO: 96 FL (ref 78–100)
MCV RBC AUTO: 98 FL (ref 78–100)
MCV RBC AUTO: 99 FL (ref 78–100)
MCV RBC AUTO: 99 FL (ref 78–100)
MONOCYTES # BLD AUTO: 0.5 10E3/UL (ref 0–1.3)
MONOCYTES NFR BLD AUTO: 3 %
MRSA DNA SPEC QL NAA+PROBE: NEGATIVE
MUCOUS THREADS #/AREA URNS LPF: PRESENT /LPF
NEUTROPHILS # BLD AUTO: 15 10E3/UL (ref 1.6–8.3)
NEUTROPHILS NFR BLD AUTO: 83 %
NITRATE UR QL: NEGATIVE
NRBC # BLD AUTO: 0 10E3/UL
NRBC BLD AUTO-RTO: 0 /100
NSE SERPL IA-MCNC: 37.2 NG/ML
NSE SERPL IA-MCNC: 41 NG/ML
O2/TOTAL GAS SETTING VFR VENT: 100 %
O2/TOTAL GAS SETTING VFR VENT: 50 %
O2/TOTAL GAS SETTING VFR VENT: 60 %
OPIATES UR QL SCN: NORMAL
OXYHGB MFR BLD: 93 % (ref 92–100)
OXYHGB MFR BLD: 94 % (ref 92–100)
OXYHGB MFR BLD: 94 % (ref 92–100)
OXYHGB MFR BLD: 95 % (ref 92–100)
OXYHGB MFR BLD: 96 % (ref 92–100)
OXYHGB MFR BLD: 97 % (ref 92–100)
OXYHGB MFR BLD: 98 % (ref 92–100)
OXYHGB MFR BLDA: 59 % (ref 92–100)
OXYHGB MFR BLDA: 84 % (ref 92–100)
OXYHGB MFR BLDA: 87 % (ref 92–100)
OXYHGB MFR BLDA: 94 % (ref 75–100)
OXYHGB MFR BLDA: 96 % (ref 75–100)
OXYHGB MFR BLDA: 98 % (ref 75–100)
OXYHGB MFR BLDA: 98 % (ref 92–100)
OXYHGB MFR BLDV: 69 %
OXYHGB MFR BLDV: 71 %
OXYHGB MFR BLDV: 73 %
P AXIS - MUSE: 37 DEGREES
PA AA BLD-ACNC: 16 %
PA AA BLD-ACNC: 83 %
PA ADP BLD-ACNC: 79 %
PA ADP BLD-ACNC: 99 %
PCO2 BLD: 29 MM HG (ref 35–45)
PCO2 BLD: 35 MM HG (ref 35–45)
PCO2 BLD: 37 MM HG (ref 35–45)
PCO2 BLD: 38 MM HG (ref 35–45)
PCO2 BLD: 38 MM HG (ref 35–45)
PCO2 BLD: 39 MM HG (ref 35–45)
PCO2 BLD: 40 MM HG (ref 35–45)
PCO2 BLD: 42 MM HG (ref 35–45)
PCO2 BLD: 42 MM HG (ref 35–45)
PCO2 BLD: 43 MM HG (ref 35–45)
PCO2 BLD: 45 MM HG (ref 35–45)
PCO2 BLD: 46 MM HG (ref 35–45)
PCO2 BLD: 47 MM HG (ref 35–45)
PCO2 BLD: 47 MM HG (ref 35–45)
PCO2 BLD: 48 MM HG (ref 35–45)
PCO2 BLD: 53 MM HG (ref 35–45)
PCO2 BLDA: 34 MM HG (ref 35–45)
PCO2 BLDA: 42 MM HG (ref 35–45)
PCO2 BLDA: 42 MM HG (ref 35–45)
PCO2 BLDA: 49 MM HG (ref 35–45)
PCO2 BLDA: 51 MM HG (ref 35–45)
PCO2 BLDA: 52 MM HG (ref 35–45)
PCO2 BLDA: 91 MM HG (ref 35–45)
PCO2 BLDV: 50 MM HG (ref 40–50)
PCO2 BLDV: 53 MM HG (ref 40–50)
PCO2 BLDV: 61 MM HG (ref 40–50)
PCO2 BLDV: 95 MM HG (ref 40–50)
PCO2 BLDV: 97 MM HG (ref 40–50)
PH BLD: 7.07 [PH] (ref 7.35–7.45)
PH BLD: 7.08 [PH] (ref 7.35–7.45)
PH BLD: 7.12 [PH] (ref 7.35–7.45)
PH BLD: 7.12 [PH] (ref 7.35–7.45)
PH BLD: 7.13 [PH] (ref 7.35–7.45)
PH BLD: 7.14 [PH] (ref 7.35–7.45)
PH BLD: 7.15 [PH] (ref 7.35–7.45)
PH BLD: 7.15 [PH] (ref 7.35–7.45)
PH BLD: 7.16 [PH] (ref 7.35–7.45)
PH BLD: 7.17 [PH] (ref 7.35–7.45)
PH BLD: 7.19 [PH] (ref 7.35–7.45)
PH BLD: 7.22 [PH] (ref 7.35–7.45)
PH BLD: 7.24 [PH] (ref 7.35–7.45)
PH BLD: 7.24 [PH] (ref 7.35–7.45)
PH BLDA: 6.91 [PH] (ref 7.35–7.45)
PH BLDA: 7.03 [PH] (ref 7.35–7.45)
PH BLDA: 7.14 [PH] (ref 7.35–7.45)
PH BLDA: 7.15 [PH] (ref 7.35–7.45)
PH BLDA: 7.16 [PH] (ref 7.35–7.45)
PH BLDA: 7.16 [PH] (ref 7.35–7.45)
PH BLDA: 7.27 [PH] (ref 7.35–7.45)
PH BLDV: 7.01 [PH] (ref 7.32–7.43)
PH BLDV: 7.01 [PH] (ref 7.32–7.43)
PH BLDV: 7.11 [PH] (ref 7.32–7.43)
PH BLDV: 7.12 [PH] (ref 7.32–7.43)
PH BLDV: 7.13 [PH] (ref 7.32–7.43)
PH UR STRIP: 5.5 [PH] (ref 5–7)
PHOSPHATE SERPL-MCNC: 4.5 MG/DL (ref 2.5–4.5)
PHOSPHATE SERPL-MCNC: 7.1 MG/DL (ref 2.5–4.5)
PHOSPHATE SERPL-MCNC: 7.2 MG/DL (ref 2.5–4.5)
PHOSPHATE SERPL-MCNC: 8 MG/DL (ref 2.5–4.5)
PLATELET # BLD AUTO: 124 10E3/UL (ref 150–450)
PLATELET # BLD AUTO: 143 10E3/UL (ref 150–450)
PLATELET # BLD AUTO: 151 10E3/UL (ref 150–450)
PLATELET # BLD AUTO: 155 10E3/UL (ref 150–450)
PLATELET # BLD AUTO: 160 10E3/UL (ref 150–450)
PLATELET # BLD AUTO: 204 10E3/UL (ref 150–450)
PLATELET # BLD AUTO: 227 10E3/UL (ref 150–450)
PLATELET # BLD AUTO: 83 10E3/UL (ref 150–450)
PLATELET # BLD AUTO: 90 10E3/UL (ref 150–450)
PO2 BLD: 102 MM HG (ref 80–105)
PO2 BLD: 107 MM HG (ref 80–105)
PO2 BLD: 111 MM HG (ref 80–105)
PO2 BLD: 112 MM HG (ref 80–105)
PO2 BLD: 115 MM HG (ref 80–105)
PO2 BLD: 116 MM HG (ref 80–105)
PO2 BLD: 116 MM HG (ref 80–105)
PO2 BLD: 120 MM HG (ref 80–105)
PO2 BLD: 124 MM HG (ref 80–105)
PO2 BLD: 124 MM HG (ref 80–105)
PO2 BLD: 127 MM HG (ref 80–105)
PO2 BLD: 129 MM HG (ref 80–105)
PO2 BLD: 143 MM HG (ref 80–105)
PO2 BLD: 144 MM HG (ref 80–105)
PO2 BLD: 146 MM HG (ref 80–105)
PO2 BLD: 148 MM HG (ref 80–105)
PO2 BLD: 151 MM HG (ref 80–105)
PO2 BLD: 153 MM HG (ref 80–105)
PO2 BLD: 154 MM HG (ref 80–105)
PO2 BLD: 188 MM HG (ref 80–105)
PO2 BLD: 87 MM HG (ref 80–105)
PO2 BLD: 91 MM HG (ref 80–105)
PO2 BLD: 93 MM HG (ref 80–105)
PO2 BLDA: 164 MM HG (ref 80–105)
PO2 BLDA: 252 MM HG (ref 80–105)
PO2 BLDA: 424 MM HG (ref 80–105)
PO2 BLDA: 52 MM HG (ref 80–105)
PO2 BLDA: 69 MM HG (ref 80–105)
PO2 BLDA: 73 MM HG (ref 80–105)
PO2 BLDA: 93 MM HG (ref 80–105)
PO2 BLDV: 24 MM HG (ref 25–47)
PO2 BLDV: 28 MM HG (ref 25–47)
PO2 BLDV: 45 MM HG (ref 25–47)
PO2 BLDV: 47 MM HG (ref 25–47)
PO2 BLDV: 48 MM HG (ref 25–47)
POTASSIUM BLD-SCNC: 3.4 MMOL/L (ref 3.5–5)
POTASSIUM BLD-SCNC: 3.9 MMOL/L (ref 3.5–5)
POTASSIUM BLD-SCNC: 4.4 MMOL/L (ref 3.5–5)
POTASSIUM BLD-SCNC: 4.5 MMOL/L (ref 3.5–5)
POTASSIUM BLD-SCNC: 4.6 MMOL/L (ref 3.4–5.3)
POTASSIUM BLD-SCNC: 4.8 MMOL/L (ref 3.4–5.3)
POTASSIUM BLD-SCNC: 4.9 MMOL/L (ref 3.4–5.3)
POTASSIUM BLD-SCNC: 5 MMOL/L (ref 3.4–5.3)
POTASSIUM BLD-SCNC: 5 MMOL/L (ref 3.4–5.3)
POTASSIUM BLD-SCNC: 5.2 MMOL/L (ref 3.4–5.3)
POTASSIUM BLD-SCNC: 5.3 MMOL/L (ref 3.4–5.3)
POTASSIUM BLD-SCNC: 5.3 MMOL/L (ref 3.4–5.3)
POTASSIUM BLD-SCNC: 5.7 MMOL/L (ref 3.4–5.3)
POTASSIUM BLD-SCNC: 5.7 MMOL/L (ref 3.4–5.3)
POTASSIUM BLD-SCNC: 7.3 MMOL/L (ref 3.4–5.3)
POTASSIUM BLD-SCNC: 9.7 MMOL/L (ref 3.4–5.3)
PR INTERVAL - MUSE: 178 MS
PROCALCITONIN SERPL-MCNC: 0.54 NG/ML
PROCALCITONIN SERPL-MCNC: 64.94 NG/ML
PROT SERPL-MCNC: 4.7 G/DL (ref 6.8–8.8)
PROT SERPL-MCNC: 4.9 G/DL (ref 6.8–8.8)
PROT SERPL-MCNC: 5 G/DL (ref 6.8–8.8)
PROT SERPL-MCNC: 5.3 G/DL (ref 6.8–8.8)
PROT SERPL-MCNC: 5.4 G/DL (ref 6.8–8.8)
PROT SERPL-MCNC: 5.9 G/DL (ref 6.8–8.8)
QRS DURATION - MUSE: 164 MS
QT - MUSE: 508 MS
QTC - MUSE: 548 MS
R AXIS - MUSE: 34 DEGREES
RADIOLOGIST FLAGS: ABNORMAL
RBC # BLD AUTO: 2.37 10E6/UL (ref 4.4–5.9)
RBC # BLD AUTO: 2.8 10E6/UL (ref 4.4–5.9)
RBC # BLD AUTO: 3.45 10E6/UL (ref 4.4–5.9)
RBC # BLD AUTO: 3.48 10E6/UL (ref 4.4–5.9)
RBC # BLD AUTO: 3.69 10E6/UL (ref 4.4–5.9)
RBC # BLD AUTO: 3.72 10E6/UL (ref 4.4–5.9)
RBC # BLD AUTO: 3.75 10E6/UL (ref 4.4–5.9)
RBC # BLD AUTO: 4.21 10E6/UL (ref 4.4–5.9)
RBC # BLD AUTO: 4.94 10E6/UL (ref 4.4–5.9)
RBC URINE: 9 /HPF
RSV RNA SPEC NAA+PROBE: NEGATIVE
SA TARGET DNA: NEGATIVE
SAO2 % BLDV: 20 % (ref 94–100)
SAO2 % BLDV: 27 % (ref 94–100)
SARS-COV-2 RNA RESP QL NAA+PROBE: NEGATIVE
SODIUM BLD-SCNC: 141 MMOL/L (ref 133–144)
SODIUM BLD-SCNC: 143 MMOL/L (ref 133–144)
SODIUM BLD-SCNC: 144 MMOL/L (ref 133–144)
SODIUM SERPL-SCNC: 139 MMOL/L (ref 133–144)
SODIUM SERPL-SCNC: 140 MMOL/L (ref 133–144)
SODIUM SERPL-SCNC: 141 MMOL/L (ref 133–144)
SODIUM SERPL-SCNC: 141 MMOL/L (ref 133–144)
SODIUM SERPL-SCNC: 142 MMOL/L (ref 133–144)
SODIUM SERPL-SCNC: 143 MMOL/L (ref 133–144)
SODIUM SERPL-SCNC: 144 MMOL/L (ref 133–144)
SODIUM SERPL-SCNC: 145 MMOL/L (ref 133–144)
SP GR UR STRIP: 1.01 (ref 1–1.03)
SPECIMEN EXPIRATION DATE: NORMAL
SPECIMEN EXPIRATION DATE: NORMAL
SQUAMOUS EPITHELIAL: <1 /HPF
SYSTOLIC BLOOD PRESSURE - MUSE: NORMAL MMHG
T AXIS - MUSE: 187 DEGREES
TRANSITIONAL EPI: 1 /HPF
TROPONIN I SERPL HS-MCNC: ABNORMAL NG/L
UFH PPP CHRO-ACNC: 0.17 IU/ML
UFH PPP CHRO-ACNC: 0.63 IU/ML
UFH PPP CHRO-ACNC: <0.1 IU/ML
UNIT ABO/RH: NORMAL
UNIT NUMBER: NORMAL
UNIT STATUS: NORMAL
UNIT TYPE ISBT: 6200
UROBILINOGEN UR STRIP-MCNC: NORMAL MG/DL
VENTRICULAR RATE- MUSE: 70 BPM
WBC # BLD AUTO: 10 10E3/UL (ref 4–11)
WBC # BLD AUTO: 17.9 10E3/UL (ref 4–11)
WBC # BLD AUTO: 20.9 10E3/UL (ref 4–11)
WBC # BLD AUTO: 7.8 10E3/UL (ref 4–11)
WBC # BLD AUTO: 8.2 10E3/UL (ref 4–11)
WBC # BLD AUTO: 8.5 10E3/UL (ref 4–11)
WBC # BLD AUTO: 8.8 10E3/UL (ref 4–11)
WBC # BLD AUTO: 9.9 10E3/UL (ref 4–11)
WBC # BLD AUTO: 9.9 10E3/UL (ref 4–11)
WBC URINE: 18 /HPF

## 2022-01-01 PROCEDURE — 92950 HEART/LUNG RESUSCITATION CPR: CPT | Performed by: FAMILY MEDICINE

## 2022-01-01 PROCEDURE — 83615 LACTATE (LD) (LDH) ENZYME: CPT | Performed by: STUDENT IN AN ORGANIZED HEALTH CARE EDUCATION/TRAINING PROGRAM

## 2022-01-01 PROCEDURE — 85396 CLOTTING ASSAY WHOLE BLOOD: CPT | Performed by: STUDENT IN AN ORGANIZED HEALTH CARE EDUCATION/TRAINING PROGRAM

## 2022-01-01 PROCEDURE — 999N000075 HC STATISTIC IABP MONITORING

## 2022-01-01 PROCEDURE — 85730 THROMBOPLASTIN TIME PARTIAL: CPT | Performed by: STUDENT IN AN ORGANIZED HEALTH CARE EDUCATION/TRAINING PROGRAM

## 2022-01-01 PROCEDURE — 76705 ECHO EXAM OF ABDOMEN: CPT

## 2022-01-01 PROCEDURE — 85027 COMPLETE CBC AUTOMATED: CPT | Performed by: STUDENT IN AN ORGANIZED HEALTH CARE EDUCATION/TRAINING PROGRAM

## 2022-01-01 PROCEDURE — 272N000057 HC CATH BALLOON IABP

## 2022-01-01 PROCEDURE — 71045 X-RAY EXAM CHEST 1 VIEW: CPT | Mod: 26 | Performed by: RADIOLOGY

## 2022-01-01 PROCEDURE — 83690 ASSAY OF LIPASE: CPT | Performed by: PHYSICIAN ASSISTANT

## 2022-01-01 PROCEDURE — 99207 PR SC NO CHARGE VISIT: CPT | Performed by: INTERNAL MEDICINE

## 2022-01-01 PROCEDURE — 70450 CT HEAD/BRAIN W/O DYE: CPT

## 2022-01-01 PROCEDURE — 84132 ASSAY OF SERUM POTASSIUM: CPT | Performed by: INTERNAL MEDICINE

## 2022-01-01 PROCEDURE — P9041 ALBUMIN (HUMAN),5%, 50ML: HCPCS

## 2022-01-01 PROCEDURE — 85300 ANTITHROMBIN III ACTIVITY: CPT | Performed by: STUDENT IN AN ORGANIZED HEALTH CARE EDUCATION/TRAINING PROGRAM

## 2022-01-01 PROCEDURE — 999N000015 HC STATISTIC ARTERIAL MONITORING DAILY

## 2022-01-01 PROCEDURE — 250N000009 HC RX 250: Performed by: NURSE PRACTITIONER

## 2022-01-01 PROCEDURE — 33949 ECMO/ECLS DAILY MGMT ARTERY: CPT

## 2022-01-01 PROCEDURE — 93880 EXTRACRANIAL BILAT STUDY: CPT

## 2022-01-01 PROCEDURE — 82803 BLOOD GASES ANY COMBINATION: CPT

## 2022-01-01 PROCEDURE — 83051 HEMOGLOBIN PLASMA: CPT | Performed by: STUDENT IN AN ORGANIZED HEALTH CARE EDUCATION/TRAINING PROGRAM

## 2022-01-01 PROCEDURE — 80347 BENZODIAZEPINES 13 OR MORE: CPT | Performed by: STUDENT IN AN ORGANIZED HEALTH CARE EDUCATION/TRAINING PROGRAM

## 2022-01-01 PROCEDURE — 70450 CT HEAD/BRAIN W/O DYE: CPT | Mod: 26 | Performed by: RADIOLOGY

## 2022-01-01 PROCEDURE — 99292 CRITICAL CARE ADDL 30 MIN: CPT | Performed by: INTERNAL MEDICINE

## 2022-01-01 PROCEDURE — 84145 PROCALCITONIN (PCT): CPT | Performed by: STUDENT IN AN ORGANIZED HEALTH CARE EDUCATION/TRAINING PROGRAM

## 2022-01-01 PROCEDURE — 82330 ASSAY OF CALCIUM: CPT | Performed by: INTERNAL MEDICINE

## 2022-01-01 PROCEDURE — 83605 ASSAY OF LACTIC ACID: CPT

## 2022-01-01 PROCEDURE — 85610 PROTHROMBIN TIME: CPT | Performed by: STUDENT IN AN ORGANIZED HEALTH CARE EDUCATION/TRAINING PROGRAM

## 2022-01-01 PROCEDURE — 83036 HEMOGLOBIN GLYCOSYLATED A1C: CPT | Performed by: NURSE PRACTITIONER

## 2022-01-01 PROCEDURE — 85384 FIBRINOGEN ACTIVITY: CPT | Performed by: PHYSICIAN ASSISTANT

## 2022-01-01 PROCEDURE — P9016 RBC LEUKOCYTES REDUCED: HCPCS | Performed by: STUDENT IN AN ORGANIZED HEALTH CARE EDUCATION/TRAINING PROGRAM

## 2022-01-01 PROCEDURE — 93454 CORONARY ARTERY ANGIO S&I: CPT | Performed by: INTERNAL MEDICINE

## 2022-01-01 PROCEDURE — 33952 ECMO/ECLS INSJ PRPH CANNULA: CPT | Performed by: INTERNAL MEDICINE

## 2022-01-01 PROCEDURE — 84132 ASSAY OF SERUM POTASSIUM: CPT | Performed by: STUDENT IN AN ORGANIZED HEALTH CARE EDUCATION/TRAINING PROGRAM

## 2022-01-01 PROCEDURE — 85520 HEPARIN ASSAY: CPT | Performed by: STUDENT IN AN ORGANIZED HEALTH CARE EDUCATION/TRAINING PROGRAM

## 2022-01-01 PROCEDURE — 95720 EEG PHY/QHP EA INCR W/VEEG: CPT | Performed by: PSYCHIATRY & NEUROLOGY

## 2022-01-01 PROCEDURE — 250N000013 HC RX MED GY IP 250 OP 250 PS 637: Performed by: INTERNAL MEDICINE

## 2022-01-01 PROCEDURE — 200N000002 HC R&B ICU UMMC

## 2022-01-01 PROCEDURE — 99233 SBSQ HOSP IP/OBS HIGH 50: CPT | Mod: 25 | Performed by: PSYCHIATRY & NEUROLOGY

## 2022-01-01 PROCEDURE — C1769 GUIDE WIRE: HCPCS | Performed by: INTERNAL MEDICINE

## 2022-01-01 PROCEDURE — 86316 IMMUNOASSAY TUMOR OTHER: CPT | Performed by: STUDENT IN AN ORGANIZED HEALTH CARE EDUCATION/TRAINING PROGRAM

## 2022-01-01 PROCEDURE — G0463 HOSPITAL OUTPT CLINIC VISIT: HCPCS

## 2022-01-01 PROCEDURE — 85652 RBC SED RATE AUTOMATED: CPT | Performed by: STUDENT IN AN ORGANIZED HEALTH CARE EDUCATION/TRAINING PROGRAM

## 2022-01-01 PROCEDURE — 36556 INSERT NON-TUNNEL CV CATH: CPT | Performed by: INTERNAL MEDICINE

## 2022-01-01 PROCEDURE — 82248 BILIRUBIN DIRECT: CPT | Performed by: INTERNAL MEDICINE

## 2022-01-01 PROCEDURE — 999N000185 HC STATISTIC TRANSPORT TIME EA 15 MIN

## 2022-01-01 PROCEDURE — 71250 CT THORAX DX C-: CPT

## 2022-01-01 PROCEDURE — 93454 CORONARY ARTERY ANGIO S&I: CPT | Mod: 26 | Performed by: INTERNAL MEDICINE

## 2022-01-01 PROCEDURE — 85347 COAGULATION TIME ACTIVATED: CPT

## 2022-01-01 PROCEDURE — 250N000011 HC RX IP 250 OP 636: Performed by: INTERNAL MEDICINE

## 2022-01-01 PROCEDURE — 99221 1ST HOSP IP/OBS SF/LOW 40: CPT | Performed by: INTERNAL MEDICINE

## 2022-01-01 PROCEDURE — C9113 INJ PANTOPRAZOLE SODIUM, VIA: HCPCS | Performed by: STUDENT IN AN ORGANIZED HEALTH CARE EDUCATION/TRAINING PROGRAM

## 2022-01-01 PROCEDURE — P9041 ALBUMIN (HUMAN),5%, 50ML: HCPCS | Performed by: STUDENT IN AN ORGANIZED HEALTH CARE EDUCATION/TRAINING PROGRAM

## 2022-01-01 PROCEDURE — 82947 ASSAY GLUCOSE BLOOD QUANT: CPT

## 2022-01-01 PROCEDURE — 999N000077 HC STATISTIC INSERT IABP

## 2022-01-01 PROCEDURE — 85027 COMPLETE CBC AUTOMATED: CPT | Performed by: INTERNAL MEDICINE

## 2022-01-01 PROCEDURE — 33967 INSERT I-AORT PERCUT DEVICE: CPT | Mod: 51 | Performed by: INTERNAL MEDICINE

## 2022-01-01 PROCEDURE — 83735 ASSAY OF MAGNESIUM: CPT | Performed by: STUDENT IN AN ORGANIZED HEALTH CARE EDUCATION/TRAINING PROGRAM

## 2022-01-01 PROCEDURE — 74018 RADEX ABDOMEN 1 VIEW: CPT | Mod: 26 | Performed by: STUDENT IN AN ORGANIZED HEALTH CARE EDUCATION/TRAINING PROGRAM

## 2022-01-01 PROCEDURE — 5A1945Z RESPIRATORY VENTILATION, 24-96 CONSECUTIVE HOURS: ICD-10-PCS | Performed by: FAMILY MEDICINE

## 2022-01-01 PROCEDURE — 250N000009 HC RX 250: Performed by: STUDENT IN AN ORGANIZED HEALTH CARE EDUCATION/TRAINING PROGRAM

## 2022-01-01 PROCEDURE — 93306 TTE W/DOPPLER COMPLETE: CPT | Mod: 26 | Performed by: INTERNAL MEDICINE

## 2022-01-01 PROCEDURE — 99152 MOD SED SAME PHYS/QHP 5/>YRS: CPT | Performed by: INTERNAL MEDICINE

## 2022-01-01 PROCEDURE — 87205 SMEAR GRAM STAIN: CPT | Performed by: STUDENT IN AN ORGANIZED HEALTH CARE EDUCATION/TRAINING PROGRAM

## 2022-01-01 PROCEDURE — 82805 BLOOD GASES W/O2 SATURATION: CPT | Performed by: STUDENT IN AN ORGANIZED HEALTH CARE EDUCATION/TRAINING PROGRAM

## 2022-01-01 PROCEDURE — 99291 CRITICAL CARE FIRST HOUR: CPT | Mod: 25 | Performed by: INTERNAL MEDICINE

## 2022-01-01 PROCEDURE — 82947 ASSAY GLUCOSE BLOOD QUANT: CPT | Performed by: STUDENT IN AN ORGANIZED HEALTH CARE EDUCATION/TRAINING PROGRAM

## 2022-01-01 PROCEDURE — 82330 ASSAY OF CALCIUM: CPT | Performed by: STUDENT IN AN ORGANIZED HEALTH CARE EDUCATION/TRAINING PROGRAM

## 2022-01-01 PROCEDURE — 82533 TOTAL CORTISOL: CPT | Performed by: STUDENT IN AN ORGANIZED HEALTH CARE EDUCATION/TRAINING PROGRAM

## 2022-01-01 PROCEDURE — 258N000003 HC RX IP 258 OP 636: Performed by: STUDENT IN AN ORGANIZED HEALTH CARE EDUCATION/TRAINING PROGRAM

## 2022-01-01 PROCEDURE — 83735 ASSAY OF MAGNESIUM: CPT | Performed by: INTERNAL MEDICINE

## 2022-01-01 PROCEDURE — 84450 TRANSFERASE (AST) (SGOT): CPT | Performed by: STUDENT IN AN ORGANIZED HEALTH CARE EDUCATION/TRAINING PROGRAM

## 2022-01-01 PROCEDURE — 83605 ASSAY OF LACTIC ACID: CPT | Performed by: STUDENT IN AN ORGANIZED HEALTH CARE EDUCATION/TRAINING PROGRAM

## 2022-01-01 PROCEDURE — 85384 FIBRINOGEN ACTIVITY: CPT | Performed by: STUDENT IN AN ORGANIZED HEALTH CARE EDUCATION/TRAINING PROGRAM

## 2022-01-01 PROCEDURE — 95714 VEEG EA 12-26 HR UNMNTR: CPT

## 2022-01-01 PROCEDURE — 999N000157 HC STATISTIC RCP TIME EA 10 MIN

## 2022-01-01 PROCEDURE — 5A1D90Z PERFORMANCE OF URINARY FILTRATION, CONTINUOUS, GREATER THAN 18 HOURS PER DAY: ICD-10-PCS | Performed by: INTERNAL MEDICINE

## 2022-01-01 PROCEDURE — 93010 ELECTROCARDIOGRAM REPORT: CPT | Mod: 76 | Performed by: INTERNAL MEDICINE

## 2022-01-01 PROCEDURE — 84484 ASSAY OF TROPONIN QUANT: CPT | Performed by: STUDENT IN AN ORGANIZED HEALTH CARE EDUCATION/TRAINING PROGRAM

## 2022-01-01 PROCEDURE — 71250 CT THORAX DX C-: CPT | Mod: 26 | Performed by: RADIOLOGY

## 2022-01-01 PROCEDURE — 258N000003 HC RX IP 258 OP 636

## 2022-01-01 PROCEDURE — 86923 COMPATIBILITY TEST ELECTRIC: CPT | Performed by: STUDENT IN AN ORGANIZED HEALTH CARE EDUCATION/TRAINING PROGRAM

## 2022-01-01 PROCEDURE — 93880 EXTRACRANIAL BILAT STUDY: CPT | Mod: 26 | Performed by: RADIOLOGY

## 2022-01-01 PROCEDURE — 82040 ASSAY OF SERUM ALBUMIN: CPT | Performed by: STUDENT IN AN ORGANIZED HEALTH CARE EDUCATION/TRAINING PROGRAM

## 2022-01-01 PROCEDURE — 84100 ASSAY OF PHOSPHORUS: CPT | Performed by: STUDENT IN AN ORGANIZED HEALTH CARE EDUCATION/TRAINING PROGRAM

## 2022-01-01 PROCEDURE — 86901 BLOOD TYPING SEROLOGIC RH(D): CPT | Performed by: STUDENT IN AN ORGANIZED HEALTH CARE EDUCATION/TRAINING PROGRAM

## 2022-01-01 PROCEDURE — 250N000011 HC RX IP 250 OP 636: Performed by: NURSE PRACTITIONER

## 2022-01-01 PROCEDURE — 258N000003 HC RX IP 258 OP 636: Performed by: INTERNAL MEDICINE

## 2022-01-01 PROCEDURE — 258N000001 HC RX 258: Performed by: STUDENT IN AN ORGANIZED HEALTH CARE EDUCATION/TRAINING PROGRAM

## 2022-01-01 PROCEDURE — 85379 FIBRIN DEGRADATION QUANT: CPT | Performed by: STUDENT IN AN ORGANIZED HEALTH CARE EDUCATION/TRAINING PROGRAM

## 2022-01-01 PROCEDURE — 87040 BLOOD CULTURE FOR BACTERIA: CPT | Performed by: STUDENT IN AN ORGANIZED HEALTH CARE EDUCATION/TRAINING PROGRAM

## 2022-01-01 PROCEDURE — 83605 ASSAY OF LACTIC ACID: CPT | Performed by: PHYSICIAN ASSISTANT

## 2022-01-01 PROCEDURE — 82803 BLOOD GASES ANY COMBINATION: CPT | Performed by: STUDENT IN AN ORGANIZED HEALTH CARE EDUCATION/TRAINING PROGRAM

## 2022-01-01 PROCEDURE — 82803 BLOOD GASES ANY COMBINATION: CPT | Performed by: INTERNAL MEDICINE

## 2022-01-01 PROCEDURE — 99223 1ST HOSP IP/OBS HIGH 75: CPT | Mod: 25 | Performed by: PSYCHIATRY & NEUROLOGY

## 2022-01-01 PROCEDURE — 250N000013 HC RX MED GY IP 250 OP 250 PS 637: Performed by: PHYSICIAN ASSISTANT

## 2022-01-01 PROCEDURE — 71250 CT THORAX DX C-: CPT | Mod: 26 | Performed by: STUDENT IN AN ORGANIZED HEALTH CARE EDUCATION/TRAINING PROGRAM

## 2022-01-01 PROCEDURE — C1894 INTRO/SHEATH, NON-LASER: HCPCS | Performed by: INTERNAL MEDICINE

## 2022-01-01 PROCEDURE — 250N000011 HC RX IP 250 OP 636: Performed by: STUDENT IN AN ORGANIZED HEALTH CARE EDUCATION/TRAINING PROGRAM

## 2022-01-01 PROCEDURE — 999N000065 XR ABDOMEN PORT 1 VIEWS

## 2022-01-01 PROCEDURE — 74176 CT ABD & PELVIS W/O CONTRAST: CPT | Mod: 26 | Performed by: RADIOLOGY

## 2022-01-01 PROCEDURE — 33947 ECMO/ECLS INITIATION ARTERY: CPT

## 2022-01-01 PROCEDURE — 86140 C-REACTIVE PROTEIN: CPT | Performed by: STUDENT IN AN ORGANIZED HEALTH CARE EDUCATION/TRAINING PROGRAM

## 2022-01-01 PROCEDURE — 87086 URINE CULTURE/COLONY COUNT: CPT | Performed by: STUDENT IN AN ORGANIZED HEALTH CARE EDUCATION/TRAINING PROGRAM

## 2022-01-01 PROCEDURE — 85025 COMPLETE CBC W/AUTO DIFF WBC: CPT | Performed by: STUDENT IN AN ORGANIZED HEALTH CARE EDUCATION/TRAINING PROGRAM

## 2022-01-01 PROCEDURE — 82310 ASSAY OF CALCIUM: CPT | Performed by: STUDENT IN AN ORGANIZED HEALTH CARE EDUCATION/TRAINING PROGRAM

## 2022-01-01 PROCEDURE — 82805 BLOOD GASES W/O2 SATURATION: CPT | Performed by: INTERNAL MEDICINE

## 2022-01-01 PROCEDURE — 81001 URINALYSIS AUTO W/SCOPE: CPT | Performed by: STUDENT IN AN ORGANIZED HEALTH CARE EDUCATION/TRAINING PROGRAM

## 2022-01-01 PROCEDURE — 36415 COLL VENOUS BLD VENIPUNCTURE: CPT | Performed by: INTERNAL MEDICINE

## 2022-01-01 PROCEDURE — B2111ZZ FLUOROSCOPY OF MULTIPLE CORONARY ARTERIES USING LOW OSMOLAR CONTRAST: ICD-10-PCS | Performed by: INTERNAL MEDICINE

## 2022-01-01 PROCEDURE — 93010 ELECTROCARDIOGRAM REPORT: CPT | Mod: 59 | Performed by: INTERNAL MEDICINE

## 2022-01-01 PROCEDURE — 74176 CT ABD & PELVIS W/O CONTRAST: CPT | Mod: 26 | Performed by: STUDENT IN AN ORGANIZED HEALTH CARE EDUCATION/TRAINING PROGRAM

## 2022-01-01 PROCEDURE — 5A1522G EXTRACORPOREAL OXYGENATION, MEMBRANE, PERIPHERAL VENO-ARTERIAL: ICD-10-PCS | Performed by: INTERNAL MEDICINE

## 2022-01-01 PROCEDURE — 86850 RBC ANTIBODY SCREEN: CPT | Performed by: INTERNAL MEDICINE

## 2022-01-01 PROCEDURE — 33967 INSERT I-AORT PERCUT DEVICE: CPT | Performed by: INTERNAL MEDICINE

## 2022-01-01 PROCEDURE — 93975 VASCULAR STUDY: CPT | Mod: 26 | Performed by: RADIOLOGY

## 2022-01-01 PROCEDURE — 85730 THROMBOPLASTIN TIME PARTIAL: CPT | Performed by: PHYSICIAN ASSISTANT

## 2022-01-01 PROCEDURE — 99291 CRITICAL CARE FIRST HOUR: CPT | Mod: 25 | Performed by: FAMILY MEDICINE

## 2022-01-01 PROCEDURE — 93005 ELECTROCARDIOGRAM TRACING: CPT

## 2022-01-01 PROCEDURE — 71045 X-RAY EXAM CHEST 1 VIEW: CPT

## 2022-01-01 PROCEDURE — 5A02210 ASSISTANCE WITH CARDIAC OUTPUT USING BALLOON PUMP, CONTINUOUS: ICD-10-PCS | Performed by: INTERNAL MEDICINE

## 2022-01-01 PROCEDURE — 85610 PROTHROMBIN TIME: CPT | Performed by: PHYSICIAN ASSISTANT

## 2022-01-01 PROCEDURE — 250N000009 HC RX 250: Performed by: INTERNAL MEDICINE

## 2022-01-01 PROCEDURE — 999N000065 XR CHEST PORT 1 VIEW

## 2022-01-01 PROCEDURE — 272N000001 HC OR GENERAL SUPPLY STERILE: Performed by: INTERNAL MEDICINE

## 2022-01-01 PROCEDURE — 94003 VENT MGMT INPAT SUBQ DAY: CPT

## 2022-01-01 PROCEDURE — 87641 MR-STAPH DNA AMP PROBE: CPT | Performed by: PHYSICIAN ASSISTANT

## 2022-01-01 PROCEDURE — 272N000555 HC SENSOR NIRS OXIMETER, ADULT

## 2022-01-01 PROCEDURE — 272N000237 HC CARDIOHELP CIRCUIT

## 2022-01-01 PROCEDURE — 80053 COMPREHEN METABOLIC PANEL: CPT | Performed by: STUDENT IN AN ORGANIZED HEALTH CARE EDUCATION/TRAINING PROGRAM

## 2022-01-01 PROCEDURE — 250N000011 HC RX IP 250 OP 636

## 2022-01-01 PROCEDURE — 86850 RBC ANTIBODY SCREEN: CPT | Performed by: STUDENT IN AN ORGANIZED HEALTH CARE EDUCATION/TRAINING PROGRAM

## 2022-01-01 PROCEDURE — C1751 CATH, INF, PER/CENT/MIDLINE: HCPCS | Performed by: INTERNAL MEDICINE

## 2022-01-01 PROCEDURE — 370N000003 HC ANESTHESIA WARD SERVICE

## 2022-01-01 PROCEDURE — 82728 ASSAY OF FERRITIN: CPT | Performed by: STUDENT IN AN ORGANIZED HEALTH CARE EDUCATION/TRAINING PROGRAM

## 2022-01-01 PROCEDURE — 93925 LOWER EXTREMITY STUDY: CPT

## 2022-01-01 PROCEDURE — 82330 ASSAY OF CALCIUM: CPT

## 2022-01-01 PROCEDURE — 82810 BLOOD GASES O2 SAT ONLY: CPT

## 2022-01-01 PROCEDURE — 3E033XZ INTRODUCTION OF VASOPRESSOR INTO PERIPHERAL VEIN, PERCUTANEOUS APPROACH: ICD-10-PCS | Performed by: FAMILY MEDICINE

## 2022-01-01 PROCEDURE — 86901 BLOOD TYPING SEROLOGIC RH(D): CPT | Performed by: INTERNAL MEDICINE

## 2022-01-01 PROCEDURE — 250N000009 HC RX 250

## 2022-01-01 PROCEDURE — 33947 ECMO/ECLS INITIATION ARTERY: CPT | Performed by: INTERNAL MEDICINE

## 2022-01-01 PROCEDURE — 33949 ECMO/ECLS DAILY MGMT ARTERY: CPT | Performed by: INTERNAL MEDICINE

## 2022-01-01 PROCEDURE — 82150 ASSAY OF AMYLASE: CPT | Performed by: PHYSICIAN ASSISTANT

## 2022-01-01 PROCEDURE — 83605 ASSAY OF LACTIC ACID: CPT | Performed by: INTERNAL MEDICINE

## 2022-01-01 PROCEDURE — 71045 X-RAY EXAM CHEST 1 VIEW: CPT | Mod: 26 | Performed by: STUDENT IN AN ORGANIZED HEALTH CARE EDUCATION/TRAINING PROGRAM

## 2022-01-01 PROCEDURE — 85027 COMPLETE CBC AUTOMATED: CPT | Performed by: PHYSICIAN ASSISTANT

## 2022-01-01 PROCEDURE — 80307 DRUG TEST PRSMV CHEM ANLYZR: CPT | Performed by: STUDENT IN AN ORGANIZED HEALTH CARE EDUCATION/TRAINING PROGRAM

## 2022-01-01 PROCEDURE — 272N000002 HC OR SUPPLY OTHER OPNP: Performed by: INTERNAL MEDICINE

## 2022-01-01 PROCEDURE — 410N000003 HC PER-PERFUSION 1ST 30 MIN: Performed by: INTERNAL MEDICINE

## 2022-01-01 PROCEDURE — 250N000009 HC RX 250: Performed by: PHYSICIAN ASSISTANT

## 2022-01-01 PROCEDURE — 84100 ASSAY OF PHOSPHORUS: CPT | Performed by: INTERNAL MEDICINE

## 2022-01-01 PROCEDURE — 99285 EMERGENCY DEPT VISIT HI MDM: CPT | Mod: 25 | Performed by: FAMILY MEDICINE

## 2022-01-01 PROCEDURE — 82010 KETONE BODYS QUAN: CPT | Performed by: NURSE PRACTITIONER

## 2022-01-01 PROCEDURE — 93306 TTE W/DOPPLER COMPLETE: CPT

## 2022-01-01 PROCEDURE — 87040 BLOOD CULTURE FOR BACTERIA: CPT | Performed by: INTERNAL MEDICINE

## 2022-01-01 PROCEDURE — 99153 MOD SED SAME PHYS/QHP EA: CPT | Performed by: INTERNAL MEDICINE

## 2022-01-01 PROCEDURE — 5A1221J PERFORMANCE OF CARDIAC OUTPUT, CONTINUOUS, AUTOMATED: ICD-10-PCS | Performed by: FAMILY MEDICINE

## 2022-01-01 PROCEDURE — 84132 ASSAY OF SERUM POTASSIUM: CPT

## 2022-01-01 PROCEDURE — 87077 CULTURE AEROBIC IDENTIFY: CPT | Performed by: STUDENT IN AN ORGANIZED HEALTH CARE EDUCATION/TRAINING PROGRAM

## 2022-01-01 PROCEDURE — 93925 LOWER EXTREMITY STUDY: CPT | Mod: 26 | Performed by: RADIOLOGY

## 2022-01-01 PROCEDURE — 94002 VENT MGMT INPAT INIT DAY: CPT

## 2022-01-01 PROCEDURE — 84155 ASSAY OF PROTEIN SERUM: CPT | Performed by: PHYSICIAN ASSISTANT

## 2022-01-01 PROCEDURE — 87637 SARSCOV2&INF A&B&RSV AMP PRB: CPT | Performed by: STUDENT IN AN ORGANIZED HEALTH CARE EDUCATION/TRAINING PROGRAM

## 2022-01-01 RX ORDER — IOPAMIDOL 755 MG/ML
INJECTION, SOLUTION INTRAVASCULAR
Status: DISCONTINUED | OUTPATIENT
Start: 2022-01-01 | End: 2022-01-01 | Stop reason: HOSPADM

## 2022-01-01 RX ORDER — BENZONATATE 100 MG/1
CAPSULE ORAL
COMMUNITY
Start: 2021-01-01

## 2022-01-01 RX ORDER — FENTANYL CITRATE 50 UG/ML
100-200 INJECTION, SOLUTION INTRAMUSCULAR; INTRAVENOUS EVERY 30 MIN PRN
Status: DISCONTINUED | OUTPATIENT
Start: 2022-01-01 | End: 2022-01-01 | Stop reason: HOSPADM

## 2022-01-01 RX ORDER — NICOTINE POLACRILEX 4 MG
15-30 LOZENGE BUCCAL
Status: DISCONTINUED | OUTPATIENT
Start: 2022-01-01 | End: 2022-01-01 | Stop reason: HOSPADM

## 2022-01-01 RX ORDER — ASPIRIN 81 MG/1
81 TABLET, CHEWABLE ORAL DAILY
Status: DISCONTINUED | OUTPATIENT
Start: 2022-01-01 | End: 2022-01-01 | Stop reason: HOSPADM

## 2022-01-01 RX ORDER — NOREPINEPHRINE BITARTRATE 0.06 MG/ML
INJECTION, SOLUTION INTRAVENOUS CONTINUOUS PRN
Status: COMPLETED | OUTPATIENT
Start: 2022-01-01 | End: 2022-01-01

## 2022-01-01 RX ORDER — HEPARIN SODIUM (PORCINE) LOCK FLUSH IV SOLN 100 UNIT/ML 100 UNIT/ML
5-10 SOLUTION INTRAVENOUS EVERY 30 MIN PRN
Status: DISCONTINUED | OUTPATIENT
Start: 2022-01-01 | End: 2022-01-01

## 2022-01-01 RX ORDER — EPINEPHRINE IN SOD CHLOR,ISO 1 MG/10 ML
SYRINGE (ML) INTRAVENOUS
Status: DISCONTINUED | OUTPATIENT
Start: 2022-01-01 | End: 2022-01-01 | Stop reason: HOSPADM

## 2022-01-01 RX ORDER — ALBUMIN, HUMAN INJ 5% 5 %
SOLUTION INTRAVENOUS
Status: COMPLETED
Start: 2022-01-01 | End: 2022-01-01

## 2022-01-01 RX ORDER — CALCIUM CHLORIDE, MAGNESIUM CHLORIDE, DEXTROSE MONOHYDRATE, LACTIC ACID, SODIUM CHLORIDE, SODIUM BICARBONATE AND POTASSIUM CHLORIDE 5.15; 2.03; 22; 5.4; 6.46; 3.09; .157 G/L; G/L; G/L; G/L; G/L; G/L; G/L
12.5 INJECTION INTRAVENOUS CONTINUOUS
Status: DISCONTINUED | OUTPATIENT
Start: 2022-01-01 | End: 2022-01-01 | Stop reason: HOSPADM

## 2022-01-01 RX ORDER — POTASSIUM CHLORIDE 29.8 MG/ML
20 INJECTION INTRAVENOUS
Status: DISCONTINUED | OUTPATIENT
Start: 2022-01-01 | End: 2022-01-01 | Stop reason: HOSPADM

## 2022-01-01 RX ORDER — ARGATROBAN 1 MG/ML
350 INJECTION, SOLUTION INTRAVENOUS
Status: DISCONTINUED | OUTPATIENT
Start: 2022-01-01 | End: 2022-01-01 | Stop reason: HOSPADM

## 2022-01-01 RX ORDER — HEPARIN SODIUM 10000 [USP'U]/100ML
10-50 INJECTION, SOLUTION INTRAVENOUS CONTINUOUS
Status: DISCONTINUED | OUTPATIENT
Start: 2022-01-01 | End: 2022-01-01 | Stop reason: HOSPADM

## 2022-01-01 RX ORDER — CLOPIDOGREL BISULFATE 75 MG/1
75 TABLET ORAL
COMMUNITY
Start: 2021-01-01

## 2022-01-01 RX ORDER — FENTANYL CITRATE 50 UG/ML
50 INJECTION, SOLUTION INTRAMUSCULAR; INTRAVENOUS EVERY 30 MIN PRN
Status: DISCONTINUED | OUTPATIENT
Start: 2022-01-01 | End: 2022-01-01

## 2022-01-01 RX ORDER — EPLERENONE 25 MG/1
25 TABLET, FILM COATED ORAL
COMMUNITY
Start: 2021-01-01

## 2022-01-01 RX ORDER — NALOXONE HYDROCHLORIDE 0.4 MG/ML
0.4 INJECTION, SOLUTION INTRAMUSCULAR; INTRAVENOUS; SUBCUTANEOUS
Status: DISCONTINUED | OUTPATIENT
Start: 2022-01-01 | End: 2022-01-01 | Stop reason: HOSPADM

## 2022-01-01 RX ORDER — ALBUMIN, HUMAN INJ 5% 5 %
12.5 SOLUTION INTRAVENOUS EVERY 5 MIN PRN
Status: DISCONTINUED | OUTPATIENT
Start: 2022-01-01 | End: 2022-01-01 | Stop reason: HOSPADM

## 2022-01-01 RX ORDER — LATANOPROST 50 UG/ML
1 SOLUTION/ DROPS OPHTHALMIC
COMMUNITY
Start: 2022-01-01

## 2022-01-01 RX ORDER — HEPARIN SODIUM 10000 [USP'U]/100ML
100-1000 INJECTION, SOLUTION INTRAVENOUS CONTINUOUS PRN
Status: DISCONTINUED | OUTPATIENT
Start: 2022-01-01 | End: 2022-01-01 | Stop reason: HOSPADM

## 2022-01-01 RX ORDER — MIDAZOLAM HCL IN 0.9 % NACL/PF 1 MG/ML
1-8 PLASTIC BAG, INJECTION (ML) INTRAVENOUS CONTINUOUS
Status: DISCONTINUED | OUTPATIENT
Start: 2022-01-01 | End: 2022-01-01 | Stop reason: HOSPADM

## 2022-01-01 RX ORDER — MAGNESIUM SULFATE HEPTAHYDRATE 500 MG/ML
INJECTION, SOLUTION INTRAMUSCULAR; INTRAVENOUS CONTINUOUS PRN
Status: COMPLETED | OUTPATIENT
Start: 2022-01-01 | End: 2022-01-01

## 2022-01-01 RX ORDER — LORATADINE 10 MG/1
10 TABLET ORAL
COMMUNITY
Start: 2021-01-01

## 2022-01-01 RX ORDER — NALOXONE HYDROCHLORIDE 0.4 MG/ML
0.2 INJECTION, SOLUTION INTRAMUSCULAR; INTRAVENOUS; SUBCUTANEOUS
Status: DISCONTINUED | OUTPATIENT
Start: 2022-01-01 | End: 2022-01-01 | Stop reason: HOSPADM

## 2022-01-01 RX ORDER — HEPARIN SODIUM 10000 [USP'U]/100ML
10-50 INJECTION, SOLUTION INTRAVENOUS CONTINUOUS
Status: DISCONTINUED | OUTPATIENT
Start: 2022-01-01 | End: 2022-01-01

## 2022-01-01 RX ORDER — CLOPIDOGREL BISULFATE 75 MG/1
75 TABLET ORAL DAILY
Status: DISCONTINUED | OUTPATIENT
Start: 2022-01-01 | End: 2022-01-01 | Stop reason: HOSPADM

## 2022-01-01 RX ORDER — CYCLOBENZAPRINE HCL 10 MG
10 TABLET ORAL
COMMUNITY
Start: 2021-02-26

## 2022-01-01 RX ORDER — MIDAZOLAM HCL IN 0.9 % NACL/PF 1 MG/ML
PLASTIC BAG, INJECTION (ML) INTRAVENOUS CONTINUOUS PRN
Status: COMPLETED | OUTPATIENT
Start: 2022-01-01 | End: 2022-01-01

## 2022-01-01 RX ORDER — FENTANYL CITRATE 50 UG/ML
100-200 INJECTION, SOLUTION INTRAMUSCULAR; INTRAVENOUS EVERY 10 MIN PRN
Status: DISCONTINUED | OUTPATIENT
Start: 2022-01-01 | End: 2022-01-01 | Stop reason: HOSPADM

## 2022-01-01 RX ORDER — DEXTROSE MONOHYDRATE 100 MG/ML
INJECTION, SOLUTION INTRAVENOUS CONTINUOUS PRN
Status: DISCONTINUED | OUTPATIENT
Start: 2022-01-01 | End: 2022-01-01 | Stop reason: HOSPADM

## 2022-01-01 RX ORDER — ASPIRIN 81 MG/1
TABLET, CHEWABLE ORAL
COMMUNITY
Start: 2021-01-01

## 2022-01-01 RX ORDER — POTASSIUM CHLORIDE 29.8 MG/ML
20 INJECTION INTRAVENOUS EVERY 8 HOURS PRN
Status: DISCONTINUED | OUTPATIENT
Start: 2022-01-01 | End: 2022-01-01 | Stop reason: HOSPADM

## 2022-01-01 RX ORDER — FENTANYL CITRATE 50 UG/ML
50 INJECTION, SOLUTION INTRAMUSCULAR; INTRAVENOUS EVERY 10 MIN PRN
Status: DISCONTINUED | OUTPATIENT
Start: 2022-01-01 | End: 2022-01-01

## 2022-01-01 RX ORDER — NOREPINEPHRINE BITARTRATE 0.06 MG/ML
.01-.6 INJECTION, SOLUTION INTRAVENOUS CONTINUOUS PRN
Status: DISCONTINUED | OUTPATIENT
Start: 2022-01-01 | End: 2022-01-01

## 2022-01-01 RX ORDER — DEXTROSE MONOHYDRATE 25 G/50ML
25-50 INJECTION, SOLUTION INTRAVENOUS
Status: DISCONTINUED | OUTPATIENT
Start: 2022-01-01 | End: 2022-01-01 | Stop reason: HOSPADM

## 2022-01-01 RX ORDER — LIDOCAINE HYDROCHLORIDE ANHYDROUS AND DEXTROSE MONOHYDRATE .8; 5 G/100ML; G/100ML
1-4 INJECTION, SOLUTION INTRAVENOUS CONTINUOUS
Status: DISCONTINUED | OUTPATIENT
Start: 2022-01-01 | End: 2022-01-01 | Stop reason: HOSPADM

## 2022-01-01 RX ORDER — ALBUMIN, HUMAN INJ 5% 5 %
25 SOLUTION INTRAVENOUS CONTINUOUS PRN
Status: DISCONTINUED | OUTPATIENT
Start: 2022-01-01 | End: 2022-01-01 | Stop reason: HOSPADM

## 2022-01-01 RX ORDER — CALCIUM GLUCONATE 20 MG/ML
2 INJECTION, SOLUTION INTRAVENOUS EVERY 8 HOURS PRN
Status: DISCONTINUED | OUTPATIENT
Start: 2022-01-01 | End: 2022-01-01 | Stop reason: HOSPADM

## 2022-01-01 RX ORDER — MAGNESIUM SULFATE HEPTAHYDRATE 40 MG/ML
2 INJECTION, SOLUTION INTRAVENOUS DAILY PRN
Status: DISCONTINUED | OUTPATIENT
Start: 2022-01-01 | End: 2022-01-01

## 2022-01-01 RX ORDER — HEPARIN SODIUM 200 [USP'U]/100ML
3 INJECTION, SOLUTION INTRAVENOUS CONTINUOUS
Status: DISCONTINUED | OUTPATIENT
Start: 2022-01-01 | End: 2022-01-01 | Stop reason: HOSPADM

## 2022-01-01 RX ORDER — FENTANYL CITRATE 50 UG/ML
INJECTION, SOLUTION INTRAMUSCULAR; INTRAVENOUS
Status: DISCONTINUED | OUTPATIENT
Start: 2022-01-01 | End: 2022-01-01 | Stop reason: HOSPADM

## 2022-01-01 RX ORDER — GLYCOPYRROLATE 0.2 MG/ML
0.2 INJECTION, SOLUTION INTRAMUSCULAR; INTRAVENOUS ONCE
Status: COMPLETED | OUTPATIENT
Start: 2022-01-01 | End: 2022-01-01

## 2022-01-01 RX ORDER — ASPIRIN 81 MG/1
81 TABLET ORAL DAILY
Status: DISCONTINUED | OUTPATIENT
Start: 2022-01-01 | End: 2022-01-01

## 2022-01-01 RX ORDER — PIPERACILLIN SODIUM, TAZOBACTAM SODIUM 3; .375 G/15ML; G/15ML
3.38 INJECTION, POWDER, LYOPHILIZED, FOR SOLUTION INTRAVENOUS EVERY 6 HOURS
Status: DISCONTINUED | OUTPATIENT
Start: 2022-01-01 | End: 2022-01-01

## 2022-01-01 RX ORDER — PHENYLEPHRINE HCL IN 0.9% NACL 50MG/250ML
.5-6 PLASTIC BAG, INJECTION (ML) INTRAVENOUS CONTINUOUS
Status: DISCONTINUED | OUTPATIENT
Start: 2022-01-01 | End: 2022-01-01

## 2022-01-01 RX ORDER — HEPARIN SODIUM 1000 [USP'U]/ML
INJECTION, SOLUTION INTRAVENOUS; SUBCUTANEOUS
Status: DISCONTINUED | OUTPATIENT
Start: 2022-01-01 | End: 2022-01-01 | Stop reason: HOSPADM

## 2022-01-01 RX ORDER — MIDAZOLAM HCL IN 0.9 % NACL/PF 1 MG/ML
1-8 PLASTIC BAG, INJECTION (ML) INTRAVENOUS CONTINUOUS
Status: DISCONTINUED | OUTPATIENT
Start: 2022-01-01 | End: 2022-01-01

## 2022-01-01 RX ORDER — MAGNESIUM SULFATE HEPTAHYDRATE 40 MG/ML
2 INJECTION, SOLUTION INTRAVENOUS EVERY 8 HOURS PRN
Status: DISCONTINUED | OUTPATIENT
Start: 2022-01-01 | End: 2022-01-01 | Stop reason: HOSPADM

## 2022-01-01 RX ORDER — PIPERACILLIN SODIUM, TAZOBACTAM SODIUM 4; .5 G/20ML; G/20ML
4.5 INJECTION, POWDER, LYOPHILIZED, FOR SOLUTION INTRAVENOUS EVERY 6 HOURS
Status: DISCONTINUED | OUTPATIENT
Start: 2022-01-01 | End: 2022-01-01 | Stop reason: HOSPADM

## 2022-01-01 RX ORDER — ARGATROBAN 1 MG/ML
150 INJECTION, SOLUTION INTRAVENOUS
Status: DISCONTINUED | OUTPATIENT
Start: 2022-01-01 | End: 2022-01-01 | Stop reason: HOSPADM

## 2022-01-01 RX ORDER — LIDOCAINE 40 MG/G
CREAM TOPICAL
Status: DISCONTINUED | OUTPATIENT
Start: 2022-01-01 | End: 2022-01-01 | Stop reason: HOSPADM

## 2022-01-01 RX ORDER — MAGNESIUM SULFATE HEPTAHYDRATE 40 MG/ML
4 INJECTION, SOLUTION INTRAVENOUS EVERY 4 HOURS PRN
Status: DISCONTINUED | OUTPATIENT
Start: 2022-01-01 | End: 2022-01-01

## 2022-01-01 RX ORDER — NOREPINEPHRINE BITARTRATE 0.06 MG/ML
.01-.6 INJECTION, SOLUTION INTRAVENOUS CONTINUOUS
Status: DISCONTINUED | OUTPATIENT
Start: 2022-01-01 | End: 2022-01-01 | Stop reason: HOSPADM

## 2022-01-01 RX ORDER — METOPROLOL SUCCINATE 25 MG/1
12.5 TABLET, EXTENDED RELEASE ORAL
COMMUNITY
Start: 2021-01-01

## 2022-01-01 RX ADMIN — Medication 50 MCG: at 00:00

## 2022-01-01 RX ADMIN — PIPERACILLIN AND TAZOBACTAM 4.5 G: 4; .5 INJECTION, POWDER, FOR SOLUTION INTRAVENOUS at 07:53

## 2022-01-01 RX ADMIN — CALCIUM CHLORIDE, MAGNESIUM CHLORIDE, DEXTROSE MONOHYDRATE, LACTIC ACID, SODIUM CHLORIDE, SODIUM BICARBONATE AND POTASSIUM CHLORIDE 12.5 ML/KG/HR: 5.15; 2.03; 22; 5.4; 6.46; 3.09; .157 INJECTION INTRAVENOUS at 05:01

## 2022-01-01 RX ADMIN — CALCIUM CHLORIDE 1 G: 100 INJECTION, SOLUTION INTRAVENOUS at 16:18

## 2022-01-01 RX ADMIN — Medication 0.5 MCG/KG/MIN: at 13:54

## 2022-01-01 RX ADMIN — ASPIRIN 81 MG: 81 TABLET, CHEWABLE ORAL at 12:11

## 2022-01-01 RX ADMIN — CALCIUM CHLORIDE, MAGNESIUM CHLORIDE, DEXTROSE MONOHYDRATE, LACTIC ACID, SODIUM CHLORIDE, SODIUM BICARBONATE AND POTASSIUM CHLORIDE 12.5 ML/KG/HR: 5.15; 2.03; 22; 5.4; 6.46; 3.09; .157 INJECTION INTRAVENOUS at 21:55

## 2022-01-01 RX ADMIN — CALCIUM CHLORIDE, MAGNESIUM CHLORIDE, DEXTROSE MONOHYDRATE, LACTIC ACID, SODIUM CHLORIDE, SODIUM BICARBONATE AND POTASSIUM CHLORIDE 12.5 ML/KG/HR: 5.15; 2.03; 22; 5.4; 6.46; 3.09; .157 INJECTION INTRAVENOUS at 18:07

## 2022-01-01 RX ADMIN — Medication: at 17:36

## 2022-01-01 RX ADMIN — ALBUMIN (HUMAN) 12.5 G: 12.5 INJECTION, SOLUTION INTRAVENOUS at 03:28

## 2022-01-01 RX ADMIN — SODIUM BICARBONATE 50 MEQ: 84 INJECTION INTRAVENOUS at 12:11

## 2022-01-01 RX ADMIN — DEXTROSE MONOHYDRATE 1000 ML: 100 INJECTION, SOLUTION INTRAVENOUS at 04:19

## 2022-01-01 RX ADMIN — VANCOMYCIN HYDROCHLORIDE 2500 MG: 1 INJECTION, POWDER, LYOPHILIZED, FOR SOLUTION INTRAVENOUS at 02:36

## 2022-01-01 RX ADMIN — CALCIUM CHLORIDE, MAGNESIUM CHLORIDE, DEXTROSE MONOHYDRATE, LACTIC ACID, SODIUM CHLORIDE, SODIUM BICARBONATE AND POTASSIUM CHLORIDE 12.5 ML/KG/HR: 5.15; 2.03; 22; 5.4; 6.46; 3.09; .157 INJECTION INTRAVENOUS at 10:09

## 2022-01-01 RX ADMIN — MIDAZOLAM HYDROCHLORIDE 4 MG/HR: 1 INJECTION, SOLUTION INTRAVENOUS at 16:25

## 2022-01-01 RX ADMIN — Medication: at 00:58

## 2022-01-01 RX ADMIN — HEPARIN SODIUM 3 ML/HR: 200 INJECTION, SOLUTION INTRAVENOUS at 00:51

## 2022-01-01 RX ADMIN — Medication 3 UNITS/HR: at 03:27

## 2022-01-01 RX ADMIN — Medication 50 MCG: at 02:30

## 2022-01-01 RX ADMIN — HEPARIN SODIUM AND DEXTROSE 500 UNITS/HR: 10000; 5 INJECTION INTRAVENOUS at 06:15

## 2022-01-01 RX ADMIN — SODIUM BICARBONATE: 84 INJECTION, SOLUTION INTRAVENOUS at 18:07

## 2022-01-01 RX ADMIN — MIDAZOLAM 3 MG: 1 INJECTION INTRAMUSCULAR; INTRAVENOUS at 00:00

## 2022-01-01 RX ADMIN — CLOPIDOGREL BISULFATE 75 MG: 75 TABLET ORAL at 12:11

## 2022-01-01 RX ADMIN — Medication: at 07:50

## 2022-01-01 RX ADMIN — CALCIUM CHLORIDE 1 G: 100 INJECTION, SOLUTION INTRAVENOUS at 04:52

## 2022-01-01 RX ADMIN — SODIUM BICARBONATE 50 MEQ: 84 INJECTION INTRAVENOUS at 15:56

## 2022-01-01 RX ADMIN — CALCIUM CHLORIDE 1 G: 100 INJECTION, SOLUTION INTRAVENOUS at 21:27

## 2022-01-01 RX ADMIN — Medication 0.22 MCG/KG/MIN: at 05:41

## 2022-01-01 RX ADMIN — CALCIUM CHLORIDE, MAGNESIUM CHLORIDE, DEXTROSE MONOHYDRATE, LACTIC ACID, SODIUM CHLORIDE, SODIUM BICARBONATE AND POTASSIUM CHLORIDE 12.5 ML/KG/HR: 5.15; 2.03; 22; 5.4; 6.46; 3.09; .157 INJECTION INTRAVENOUS at 01:31

## 2022-01-01 RX ADMIN — Medication 50 MCG: at 03:00

## 2022-01-01 RX ADMIN — SODIUM BICARBONATE 50 MEQ: 84 INJECTION INTRAVENOUS at 01:25

## 2022-01-01 RX ADMIN — MIDAZOLAM 3 MG: 1 INJECTION INTRAMUSCULAR; INTRAVENOUS at 01:00

## 2022-01-01 RX ADMIN — ALBUMIN (HUMAN) 25 G: 12.5 INJECTION, SOLUTION INTRAVENOUS at 12:15

## 2022-01-01 RX ADMIN — VASOPRESSIN 4 UNITS/HR: 20 INJECTION INTRAVENOUS at 01:11

## 2022-01-01 RX ADMIN — Medication: at 00:22

## 2022-01-01 RX ADMIN — PANTOPRAZOLE SODIUM 40 MG: 40 INJECTION, POWDER, FOR SOLUTION INTRAVENOUS at 07:50

## 2022-01-01 RX ADMIN — ALBUMIN (HUMAN) 25 G: 12.5 INJECTION, SOLUTION INTRAVENOUS at 09:06

## 2022-01-01 RX ADMIN — MAGNESIUM SULFATE HEPTAHYDRATE 2 G: 2 INJECTION, SOLUTION INTRAVENOUS at 11:30

## 2022-01-01 RX ADMIN — FENTANYL CITRATE 200 MCG/HR: 50 INJECTION INTRAVENOUS at 07:26

## 2022-01-01 RX ADMIN — GLYCOPYRROLATE 0.2 MG: 0.2 INJECTION, SOLUTION INTRAMUSCULAR; INTRAVENOUS at 16:15

## 2022-01-01 RX ADMIN — ALBUMIN (HUMAN) 25 G: 12.5 INJECTION, SOLUTION INTRAVENOUS at 09:36

## 2022-01-01 RX ADMIN — SODIUM BICARBONATE 50 MEQ: 84 INJECTION INTRAVENOUS at 21:01

## 2022-01-01 RX ADMIN — PIPERACILLIN AND TAZOBACTAM 4.5 G: 4; .5 INJECTION, POWDER, FOR SOLUTION INTRAVENOUS at 19:03

## 2022-01-01 RX ADMIN — AMIODARONE HYDROCHLORIDE 1 MG/MIN: 50 INJECTION, SOLUTION INTRAVENOUS at 21:31

## 2022-01-01 RX ADMIN — ALBUMIN (HUMAN) 12.5 G: 12.5 INJECTION, SOLUTION INTRAVENOUS at 20:56

## 2022-01-01 RX ADMIN — PIPERACILLIN AND TAZOBACTAM 3.38 G: 3; .375 INJECTION, POWDER, LYOPHILIZED, FOR SOLUTION INTRAVENOUS at 13:05

## 2022-01-01 RX ADMIN — MIDAZOLAM HYDROCHLORIDE 8 MG/HR: 1 INJECTION, SOLUTION INTRAVENOUS at 02:34

## 2022-01-01 RX ADMIN — VASOPRESSIN 3 UNITS/HR: 20 INJECTION INTRAVENOUS at 06:38

## 2022-01-01 RX ADMIN — DEXTROSE MONOHYDRATE 25 ML: 25 INJECTION, SOLUTION INTRAVENOUS at 21:13

## 2022-01-01 RX ADMIN — SODIUM BICARBONATE: 84 INJECTION, SOLUTION INTRAVENOUS at 10:08

## 2022-01-01 RX ADMIN — MIDAZOLAM 3 MG: 1 INJECTION INTRAMUSCULAR; INTRAVENOUS at 02:00

## 2022-01-01 RX ADMIN — DEXTROSE MONOHYDRATE 50 ML: 25 INJECTION, SOLUTION INTRAVENOUS at 12:04

## 2022-01-01 RX ADMIN — Medication 50 MEQ: at 13:12

## 2022-01-01 RX ADMIN — Medication 3 UNITS/HR: at 16:11

## 2022-01-01 RX ADMIN — MIDAZOLAM 3 MG: 1 INJECTION INTRAMUSCULAR; INTRAVENOUS at 23:00

## 2022-01-01 RX ADMIN — DEXTROSE MONOHYDRATE 50 ML: 25 INJECTION, SOLUTION INTRAVENOUS at 12:05

## 2022-01-01 RX ADMIN — FENTANYL CITRATE 200 MCG/HR: 50 INJECTION INTRAVENOUS at 00:09

## 2022-01-01 RX ADMIN — CALCIUM CHLORIDE 1 G: 100 INJECTION, SOLUTION INTRAVENOUS at 13:28

## 2022-01-01 RX ADMIN — MIDAZOLAM 3 MG: 1 INJECTION INTRAMUSCULAR; INTRAVENOUS at 03:00

## 2022-01-01 RX ADMIN — ALBUMIN (HUMAN) 25 G: 12.5 INJECTION, SOLUTION INTRAVENOUS at 02:23

## 2022-01-01 RX ADMIN — DEXTROSE MONOHYDRATE 25 ML: 25 INJECTION, SOLUTION INTRAVENOUS at 06:53

## 2022-01-01 RX ADMIN — Medication 50 MCG: at 00:30

## 2022-01-01 RX ADMIN — PIPERACILLIN AND TAZOBACTAM 3.38 G: 3; .375 INJECTION, POWDER, LYOPHILIZED, FOR SOLUTION INTRAVENOUS at 01:22

## 2022-01-01 RX ADMIN — Medication: at 07:53

## 2022-01-01 RX ADMIN — FENTANYL CITRATE 100 MCG/HR: 50 INJECTION INTRAVENOUS at 17:31

## 2022-01-01 RX ADMIN — Medication 0.05 MCG/KG/MIN: at 17:25

## 2022-01-01 RX ADMIN — PIPERACILLIN AND TAZOBACTAM 3.38 G: 3; .375 INJECTION, POWDER, LYOPHILIZED, FOR SOLUTION INTRAVENOUS at 07:45

## 2022-01-01 RX ADMIN — ASPIRIN 81 MG: 81 TABLET, CHEWABLE ORAL at 07:53

## 2022-01-01 RX ADMIN — PIPERACILLIN AND TAZOBACTAM 4.5 G: 4; .5 INJECTION, POWDER, FOR SOLUTION INTRAVENOUS at 01:33

## 2022-01-01 RX ADMIN — PANTOPRAZOLE SODIUM 40 MG: 40 INJECTION, POWDER, FOR SOLUTION INTRAVENOUS at 07:53

## 2022-01-01 RX ADMIN — ALBUMIN (HUMAN) 25 G: 12.5 INJECTION, SOLUTION INTRAVENOUS at 04:56

## 2022-01-01 RX ADMIN — CLOPIDOGREL BISULFATE 75 MG: 75 TABLET ORAL at 07:53

## 2022-01-01 RX ADMIN — ALBUMIN (HUMAN) 12.5 G: 12.5 INJECTION, SOLUTION INTRAVENOUS at 06:03

## 2022-01-01 RX ADMIN — CALCIUM CHLORIDE, MAGNESIUM CHLORIDE, DEXTROSE MONOHYDRATE, LACTIC ACID, SODIUM CHLORIDE, SODIUM BICARBONATE AND POTASSIUM CHLORIDE 12.5 ML/KG/HR: 5.15; 2.03; 22; 5.4; 6.46; 3.09; .157 INJECTION INTRAVENOUS at 13:35

## 2022-01-01 RX ADMIN — CALCIUM CHLORIDE 1 G: 100 INJECTION, SOLUTION INTRAVENOUS at 05:31

## 2022-01-01 RX ADMIN — DEXTROSE MONOHYDRATE 25 ML: 25 INJECTION, SOLUTION INTRAVENOUS at 23:01

## 2022-01-01 RX ADMIN — SODIUM BICARBONATE 50 MEQ: 84 INJECTION INTRAVENOUS at 13:12

## 2022-01-01 RX ADMIN — VANCOMYCIN HYDROCHLORIDE 2000 MG: 1 INJECTION, POWDER, LYOPHILIZED, FOR SOLUTION INTRAVENOUS at 06:35

## 2022-01-01 RX ADMIN — DEXTROSE MONOHYDRATE 50 ML: 25 INJECTION, SOLUTION INTRAVENOUS at 01:10

## 2022-01-01 RX ADMIN — DEXTROSE MONOHYDRATE 50 ML: 25 INJECTION, SOLUTION INTRAVENOUS at 04:11

## 2022-01-01 RX ADMIN — VASOPRESSIN 3 UNITS/HR: 20 INJECTION INTRAVENOUS at 12:39

## 2022-01-01 RX ADMIN — CALCIUM CHLORIDE 1 G: 100 INJECTION, SOLUTION INTRAVENOUS at 02:03

## 2022-01-01 ASSESSMENT — ACTIVITIES OF DAILY LIVING (ADL)
ADLS_ACUITY_SCORE: 24
ADLS_ACUITY_SCORE: 22
ADLS_ACUITY_SCORE: 22
ADLS_ACUITY_SCORE: 12
ADLS_ACUITY_SCORE: 24
ADLS_ACUITY_SCORE: 22
ADLS_ACUITY_SCORE: 22
ADLS_ACUITY_SCORE: 24
ADLS_ACUITY_SCORE: 22
ADLS_ACUITY_SCORE: 12
ADLS_ACUITY_SCORE: 22
ADLS_ACUITY_SCORE: 24
ADLS_ACUITY_SCORE: 22
ADLS_ACUITY_SCORE: 22
ADLS_ACUITY_SCORE: 24
ADLS_ACUITY_SCORE: 12
ADLS_ACUITY_SCORE: 24
ADLS_ACUITY_SCORE: 24
ADLS_ACUITY_SCORE: 22
ADLS_ACUITY_SCORE: 24
ADLS_ACUITY_SCORE: 22
ADLS_ACUITY_SCORE: 24
ADLS_ACUITY_SCORE: 24
ADLS_ACUITY_SCORE: 22
ADLS_ACUITY_SCORE: 24
ADLS_ACUITY_SCORE: 24
ADLS_ACUITY_SCORE: 12
ADLS_ACUITY_SCORE: 22
ADLS_ACUITY_SCORE: 24
ADLS_ACUITY_SCORE: 12

## 2022-03-16 PROBLEM — I46.9 CARDIAC ARREST (H): Status: ACTIVE | Noted: 2022-01-01

## 2022-03-16 NOTE — Clinical Note
Prepped: groin. Prepped with: Betadine. Patient arrived in cardiac arrest with ANA CPR in progress, transported to cath lab with cath lab MD and RN. Ventilation provided by LMA, bagging by respiratory. Transferred to cath lab table and prepped emergently for ECMO cannulation

## 2022-03-16 NOTE — Clinical Note
"Chief Complaint  Hypertension (Pt states that his bp has been controlled and denies any s/s r/t htn. )    Subjective    History of Present Illness      Patient presents to [facility] for   Hypertension  This is a chronic problem. The current episode started more than 1 year ago. The problem is controlled. The current treatment provides significant improvement. There are no compliance problems.         Review of Systems   All other systems reviewed and are negative.      I have reviewed and agree with the HPI and ROS information as above.  Basilio Russell MD     Objective   Vital Signs:   /91   Pulse 93   Resp 20   Ht 177.8 cm (70\")   Wt 67.1 kg (148 lb)   BMI 21.24 kg/m²       Physical Exam  Constitutional:       Appearance: Normal appearance. He is well-developed.   HENT:      Head: Normocephalic and atraumatic.      Right Ear: External ear normal.      Left Ear: External ear normal.      Nose: Nose normal. No nasal tenderness or congestion.      Mouth/Throat:      Lips: Pink. No lesions.      Mouth: Mucous membranes are moist. No oral lesions.      Dentition: Normal dentition.      Pharynx: Oropharynx is clear. No pharyngeal swelling, oropharyngeal exudate or posterior oropharyngeal erythema.   Eyes:      General: Lids are normal. Vision grossly intact. No scleral icterus.        Right eye: No discharge.         Left eye: No discharge.      Extraocular Movements: Extraocular movements intact.      Conjunctiva/sclera: Conjunctivae normal.      Right eye: Right conjunctiva is not injected.      Left eye: Left conjunctiva is not injected.      Pupils: Pupils are equal, round, and reactive to light.   Neck:      Musculoskeletal: Full passive range of motion without pain, normal range of motion and neck supple.   Cardiovascular:      Rate and Rhythm: Normal rate and regular rhythm.      Heart sounds: Normal heart sounds. No murmur. No gallop.    Pulmonary:      Effort: Pulmonary " right femoral artery  effort is normal.      Breath sounds: Normal breath sounds and air entry. No wheezing, rhonchi or rales.   Musculoskeletal: Normal range of motion.         General: No tenderness or deformity.      Right lower leg: No edema.      Left lower leg: No edema.   Skin:     General: Skin is warm and dry.      Coloration: Skin is not jaundiced.      Findings: No rash.   Neurological:      Mental Status: He is alert and oriented to person, place, and time.      Cranial Nerves: Cranial nerves are intact.      Sensory: Sensation is intact.      Motor: Motor function is intact.      Coordination: Coordination is intact.      Gait: Gait is intact.   Psychiatric:         Attention and Perception: Attention normal.         Mood and Affect: Mood and affect normal.         Behavior: Behavior is not hyperactive. Behavior is cooperative.         Thought Content: Thought content normal.         Judgment: Judgment normal.          Result Review  Data Reviewed:                   Assessment and Plan    Patient's Body mass index is 21.24 kg/m². BMI is within normal parameters. No follow-up required..    Problem List Items Addressed This Visit        Cardiac and Vasculature    Essential hypertension    Current Assessment & Plan     Hypertension is unchanged.  Continue current treatment regimen.  Blood pressure will be reassessed in 3 months.         Relevant Medications    lisinopril-hydrochlorothiazide (PRINZIDE,ZESTORETIC) 20-12.5 MG per tablet    verapamil (CALAN) 120 MG tablet       Endocrine and Metabolic    Prediabetes       Health Encounters    Encounter for wellness examination in adult - Primary    Relevant Orders    Comprehensive metabolic panel    Lipid panel    Urinalysis With Culture If Indicated -    Hemoglobin A1c    Hepatitis C Antibody    CBC & Differential    Ambulatory Referral to Gastroenterology       Sleep    Insomnia disorder related to known organic factor      Other Visit Diagnoses     Screening for malignant  neoplasm of colon        Relevant Orders    Ambulatory Referral to Gastroenterology              Follow Up   No follow-ups on file.  Patient was given instructions and counseling regarding his condition or for health maintenance advice. Please see specific information pulled into the AVS if appropriate.

## 2022-03-16 NOTE — Clinical Note
Arrhythmia Type: polymorphic VT.   Method of Cardioversion: defibrillated.   The arrhythmia was terminated.   Energy shock delivered: 200 joules.   Time shock delivered: 20:55 CDT.

## 2022-03-16 NOTE — Clinical Note
Arrhythmia Type: polymorphic VT.   Method of Cardioversion: defibrillated.   The arrhythmia was terminated.   Energy shock delivered: 200 joules.   Time shock delivered: 21:14 CDT.

## 2022-03-16 NOTE — Clinical Note
Arrhythmia Type: polymorphic VT.   Method of Cardioversion: defibrillated.   The arrhythmia was not terminated. Energy shock delivered: 200 joules.    done

## 2022-03-16 NOTE — Clinical Note
IABP inserted in the left femoral artery. IABP inserted with 50 cc balloon volume Lot number is: [id number]

## 2022-03-17 NOTE — PHARMACY-VANCOMYCIN DOSING SERVICE
"Pharmacy Vancomycin Initial Note  Date of Service 2022  Patient's  1955  67 year old, male    Indication: Aspiration Pneumonia    Creatinine for last 3 days  No results found for requested labs within last 72 hours.    Recent Vancomycin Level(s) for last 3 days  No results found for requested labs within last 72 hours.      Vancomycin IV Administrations (past 72 hours)      No vancomycin orders with administrations in past 72 hours.                Nephrotoxins and other renal medications (From now, onward)    Start     Dose/Rate Route Frequency Ordered Stop    22 0130  vancomycin (VANCOCIN) 2,500 mg in sodium chloride 0.9 % 500 mL intermittent infusion         20 mg/kg × 123 kg  over 120 Minutes Intravenous EVERY 24 HOURS 22 0033 22 0129    22 0030  norepinephrine (LEVOPHED) 16 mg in  mL infusion MAX CONC CENTRAL LINE         0.01-0.6 mcg/kg/min × 123 kg (Dosing Weight)  1.2-69.2 mL/hr  Intravenous CONTINUOUS 22  vasopressin 0.2 units/mL in NS (PITRESSIN) standard conc infusion         0.5-4 Units/hr  2.5-20 mL/hr  Intravenous CONTINUOUS 22  piperacillin-tazobactam (ZOSYN) 3.375 g vial to attach to  mL bag        Note to Pharmacy: For [facility], [facility] and [facility]: For Zosyn-naive patients, use the \"Zosyn initial dose + extended infusion\" order panel.    3.375 g  over 30 Minutes Intravenous EVERY 6 HOURS 22          Contrast Orders - past 72 hours (72h ago, onward)            Start     Dose/Rate Route Frequency Ordered Stop    22  iopamidol (ISOVUE-370) solution  Status:  Discontinued           ONCE PRN 22           Plan:  1. Start vancomycin 2500 mg (20mg/kg) IV q24h x 5 days.  2. Vancomycin monitoring method: Trough (Method 2 = manual dose calculation)  3. Vancomycin therapeutic monitoring goal: 15-20 mg/L  4. Pharmacy will check vancomycin levels as " appropriate in 1-3 Days.    5. Serum creatinine levels will be ordered daily for the first week of therapy and at least twice weekly for subsequent weeks.      Beverley Barrett, PharmD, BCPS

## 2022-03-17 NOTE — PROGRESS NOTES
ECMO Shift Summary: 20:34-07:00      ECMO Equipment:  Console serial number: [id number]  Circuit Lot number: [id number]  Oxygenator Lot number: [id number]      Patient remains on VA ECMO, all equipment is functioning and alarms are appropriately set. RPM's 3400 with flow range 3.6 L/min. Sweep gas is at 5 LPM and FiO2 60%. Circuit remains free of air, clot and fibrin. Cannulas are secure with no bleeding from site. Extremities are warm to touch. Suctioned ETT for scant secretions.    Significant Shift Events: persistent chugging periodically through the night which resolved with fluid administration    Vent settings:  Vent Mode: CMV/AC  (Continuous Mandatory Ventilation/ Assist Control)  FiO2 (%): 50 %  Resp Rate (Set): 18 breaths/min  Tidal Volume (Set, mL): (S) 400 mL  PEEP (cm H2O): 5 cmH2O  Resp: 18  .    Heparin is running at 500 u/hr, ACT range 165-199.    Urine output is as charted, blood loss was a moderate amount of oozing from the IABP site. Product given included 4250 ml of LR and 1000 ml of 5% albumin.       Intake/Output Summary (Last 24 hours) at 3/17/2022 0623  Last data filed at 3/17/2022 0600  Gross per 24 hour   Intake 6610.93 ml   Output 2735 ml   Net 3875.93 ml       ECHO:  No results found for this or any previous visit.  No results found for this or any previous visit.      CXR:  Recent Results (from the past 24 hour(s))   Cardiac Catheterization    Narrative      Out of hospital refractory VF arrest s/p peripheral VA ECMO cannulation.    Coronary angiogram revealed patent mid LAD stent with 50% lesion at   proximal stent edge. Non obstructive CAD in the LCX and RCA.    S/p insertion of L femoral IABP, L femoral cooling catheter and R radial   arterial line.      CT Head w/o Contrast    Impression    RESIDENT PRELIMINARY INTERPRETATION  Impression:  No acute intracranial pathology.      CT Chest Abdomen Pelvis w/o Contrast    Impression    RESIDENT PRELIMINARY  INTERPRETATION  IMPRESSION:    1. Multiple support devices as above with ECMO venous cannula tip  terminating at the superior cavoatrial junction.  2. Bilateral dependent pulmonary consolidations concerning for  infection/aspiration versus inflammatory change.  3. Nonspecific peripancreatic/periportal inflammatory change.   XR Chest Port 1 View    Impression    RESIDENT PRELIMINARY INTERPRETATION  IMPRESSION:   1. Endotracheal tube tip projects over the midthoracic trachea.  2. Lower approach venous ECMO cannula tip projects over the superior  cavoatrial junction.  3. Cardiomegaly with pulmonary edema and basilar opacities  representing atelectasis or consolidation and a small right pleural  effusion.   XR Abdomen Port 1 View    Impression    RESIDENT PRELIMINARY INTERPRETATION  IMPRESSION: Gastric tube tip and sidehole project over the stomach.       Labs:  Recent Labs   Lab 03/17/22  0404 03/17/22  0403 03/17/22  0203 03/17/22  0039 03/16/22  2346   PH 7.15*  --  7.22* 7.19* 7.19*   PCO2 53*  --  48* 43 46*   PO2 87  --  120* 129* 124*   HCO3 19*  --  20* 16* 17*   O2PER 50 60  50 50 50 50       Lab Results   Component Value Date    HGB 12.8 (L) 03/17/2022    PHGB 30 (H) 03/17/2022     03/17/2022    FIBR 265 03/17/2022    INR 1.28 (H) 03/17/2022    PTT 48 (H) 03/17/2022    DD 5.69 (H) 03/17/2022         Plan is to remain on ECMO support at this time.      RT Hiral  ECMO Specialist  3/17/2022 6:23 AM

## 2022-03-17 NOTE — ED TRIAGE NOTES
Pt presents via EMS f/ home following an unwitnessed cardiac arrest. Per EMS initial rhythm v-fib, a total of 6 shocks given by ems. EMS gave 300 mg amio, 2 mg of epi, 1L fluid, 1 amp of bicarb. In the ED epi administered at 2017 and 2022. Pulse checks done x2 with the rhythm being PEA. Pt brought to cath lab.

## 2022-03-17 NOTE — CONSULTS
Nephrology Initial Consult  March 17, 2022      Devonte Wisdom MRN:[id number] YOB: 1955  Date of Admission:3/16/2022  Primary care provider: No primary care provider on file.  Requesting physician: Eric Gardiner MD    ASSESSMENT AND RECOMMENDATIONS:     # Oliguric JOSE  # Cardiac arrest s/p peripheral VA ECMO  # Anion gap metabolic acidosis  # Shock liver    - Baseline serum creatinine ~ 1.2, admitted withs serum creatinine of 1.4, subsequently has worsened to 2.16  - Declining urine output. Urinalysis - specific gravity 1.011, WBC - 18, RBC - 9, presence of hyaline valeria  - His serum potassium levels have been slowly up trending and he is currently undergoing targeted temp management. Anticipate potassium levels to increase further during rewarming.   - He is severely acidotic with pH of 7.14 and bicarb 15.   - Plans to initiate CRRT for maintaining stability of electrolytes and improvement of acidosis, given his tenuous condition requiring VA ECMO, IABP, and vasopressors.   - 2 K solution, Bicarb post-filter (150 mEq in 1 L @ 200 ml/hr) UF goal - I=O  - Consent obtained from patient's spouse Adama on 3/17/2022  - Continue strict intake/output monitoring  - Daily weight  - Avoid hypotension and nephrotoxic medications.    Recommendations were communicated to primary team via note    Seen and discussed with Dr. Ale Gale MD   Division of Renal Disease and Hypertension  [facility]  myairmail  Vocera Web Console      REASON FOR CONSULT: Oliguric JOSE after out of hospital cardiac arrest, now on VA ECMO.     HISTORY OF PRESENT ILLNESS:  Admitting provider and nursing notes reviewed  Devonte Wisdom is a 67 year old male with history of type 2 diabetes, HFrEF, CAD who had out of hospital cardiac arrest.  CPR was done and he was noted to have V. fib.  Required 6 shocks.  Is currently cannulated with VA ECMO and undergoing targeted temperature management.  Her serum creatinine on admission was  1.4, with baseline serum creatinine of 1.2.  His urine output since admission has declined.  Serum potassium levels have been slowly uptrending.  He also has anion gap metabolic acidosis with pH of 7.14.  Nephrology has been consulted for oliguric JOSE with worsening potassium levels and metabolic acidosis.    PAST MEDICAL HISTORY:  Reviewed on 03/17/2022     Past Medical History:   Diagnosis Date     Asthma bronchial      HTN (hypertension)      Hypertriglyceridemia      Irritable bowel syndrome      Obesity        Past Surgical History:   Procedure Laterality Date     VITRECTOMY PARSPLANA WITH 23 GAUGE SYSTEM, LENSECTOMY  8/22/2012    Procedure: COMBINED VITRECTOMY PARSPLANA, LENSECTOMY;  RIGHT VITRECTOMY PARSPLANA 20 GAUGE, LENSECTOMY;  Surgeon: Chauncey Hercules MD;  Location: [facility] APPENDECTOMY          MEDICATIONS:  PTA Meds  Prior to Admission medications    Medication Sig Last Dose Taking? Auth Provider   aspirin (ASA) 81 MG chewable tablet On HOLD while taking Eliquis x 30 days  Yes Reported, Patient   benzonatate (TESSALON) 100 MG capsule   Yes Reported, Patient   brinzolamide-brimonidine (SIMBRINZA) 1-0.2 % ophthalmic suspension 1 drop  Yes Reported, Patient   clopidogrel (PLAVIX) 75 MG tablet Take 75 mg by mouth  Yes Reported, Patient   cyclobenzaprine (FLEXERIL) 10 MG tablet Take 10 mg by mouth  Yes Reported, Patient   eplerenone (INSPRA) 25 MG tablet Take 25 mg by mouth  Yes Reported, Patient   latanoprost (XALATAN) 0.005 % ophthalmic solution 1 drop  Yes Reported, Patient   loratadine (CLARITIN) 10 MG tablet Take 10 mg by mouth  Yes Reported, Patient   metoprolol succinate ER (TOPROL-XL) 25 MG 24 hr tablet Take 12.5 mg by mouth  Yes Reported, Patient   albuterol (2.5 MG/3ML) 0.083% nebulizer solution Take 3 mLs by nebulization See Admin Instructions.   Dg Keith MD   amLODIPine (NORVASC) 5 MG tablet Take 1 tablet by mouth daily.      Oliver Haddad MD    fluticasone (FLOVENT HFA) 220 MCG/ACT inhaler Inhale 1 puff into the lungs 2 times daily.   Dg Keith MD   hydrocodone-homatropine (HYDROMET) 5-1.5 MG/5ML syrup Take 5 mLs by mouth every 6 hours as needed.   Oliver Haddad MD   PROAIR  (90 BASE) MCG/ACT IN AERS INHALE 1 TO 2 PUFFS EVERY 4 TO 6 HOURS AS NEEDED   Dg Keith MD   valsartan (DIOVAN) 320 MG tablet Take 0.5 tablets by mouth daily.   Dg Keith MD      Current Meds    artificial tears   Both Eyes Q8H     aspirin  81 mg Oral Daily     clopidogrel  75 mg Oral Daily     pantoprazole (PROTONIX) IV  40 mg Intravenous Daily     piperacillin-tazobactam  3.375 g Intravenous Q6H     vancomycin place mike - receiving intermittent dosing  1 each Does not apply See Admin Instructions     Infusion Meds    albumin human       dextrose       CRRT replacement solution       fentaNYL 100 mcg/hr (03/17/22 1600)     heparin (PRESSURE BAG) 2 unit/mL in 0.9% NaCl 6 Units/hr (03/17/22 1600)     HEParin 400 Units/hr (03/17/22 1600)     insulin regular       midazolam Stopped (03/17/22 1300)     - MEDICATION INSTRUCTIONS -       norepinephrine 0.05 mcg/kg/min (03/17/22 1600)     POST-filter replacement solution for CVVHD & CVVHDF (Sodium Bicarbonate in water 150 mEq/L for infusion)       CRRT replacement solution       vasopressin 2 Units/hr (03/17/22 1700)       ALLERGIES:    Allergies   Allergen Reactions     Amlodipine Swelling     Leg edema - not allergy  Leg edema - not allergy       Contrast Dye      PN: LW CM1: CONTRAST- nka Reaction :     Lisinopril Cough     Dapagliflozin Rash       REVIEW OF SYSTEMS:  Unable to obtain, patient intubated and sedated    SOCIAL HISTORY:   Social History     Socioeconomic History     Marital status:      Spouse name: Not on file     Number of children: 2     Years of education: Not on file     Highest education level: Not on file   Occupational History     Occupation: theater  "     Employer: [employer]   Tobacco Use     Smoking status: Never Smoker     Smokeless tobacco: Not on file   Substance and Sexual Activity     Alcohol use: Yes     Comment: 1 glass of red wine per day     Drug use: No     Sexual activity: Yes     Partners: Female   Other Topics Concern     Parent/sibling w/ CABG, MI or angioplasty before 65F 55M? Not Asked   Social History Narrative     Not on file     Social Determinants of Health     Financial Resource Strain: Not on file   Food Insecurity: Not on file   Transportation Needs: Not on file   Physical Activity: Not on file   Stress: Not on file   Social Connections: Not on file   Intimate Partner Violence: Not on file   Housing Stability: Not on file         FAMILY MEDICAL HISTORY:   Family History   Problem Relation Age of Onset     Depression Mother      Hypertension Father      Asthma Paternal Grandmother      Hypertension Paternal Grandfather      Asthma Brother          PHYSICAL EXAM:   Temp  Av.9  F (33.8  C)  Min: 91.6  F (33.1  C)  Max: 94.1  F (34.5  C)  Arterial Line BP  Min: 77/42  Max: 116/57  Arterial Line MAP (mmHg)  Av.6 mmHg  Min: 60 mmHg  Max: 84 mmHg      Pulse  Av.6  Min: 36  Max: 74 Resp  Av.3  Min: 0  Max: 24  FiO2 (%)  Av %  Min: 50 %  Max: 50 %  SpO2  Av %  Min: 96 %  Max: 100 %       Pulse (!) 43   Temp (!) 94.1  F (34.5  C)   Resp 18   Ht 1.778 m (5' 10\")   Wt 123.3 kg (271 lb 13.2 oz)   SpO2 100%   BMI 39.00 kg/m     Date 22 07 - 22 0659   Shift 8789-3729 9195-2139 4428-2896 24 Hour Total   INTAKE   I.V. 876.76 194.42  1071.18   NG/GT 60   60   Colloid 1000   1000   Shift Total(mL/kg) 1936.76(15.71) 194.42(1.58)  2131.18(17.28)   OUTPUT   Urine 50 10  60   Emesis/NG output 1100 400  1500   Shift Total(mL/kg) 1150(9.33) 410(3.33)  1560(12.65)   Weight (kg) 123.3 123.3 123.3 123.3      Admit Weight: 123 kg (271 lb 2.7 oz)     GENERAL APPEARANCE: intubated and " sedated  EYES: no scleral icterus, pupils equal  Lymphatics: no cervical or supraclavicular LAD  Pulmonary: lungs clear to auscultation with equal breath sounds bilaterally, no clubbing  CV: regular rhythm, normal rate, no rub   - JVD not distended   - Edema absent  GI: soft, nontender, normal bowel sounds  MS: no evidence of inflammation in joints, no muscle tenderness  :  Houser +  SKIN: no rash, warm, dry, no cyanosis  NEURO: face symmetric     LABS:   I have reviewed the following labs:  CMP  Recent Labs   Lab 03/17/22  1529 03/17/22  1412 03/17/22  1246 03/17/22  0950 03/17/22  0949 03/17/22  0404 03/17/22  0403 03/16/22  2346 03/16/22  2344   NA  --  144 145*  --  142  --  143  --  140   POTASSIUM  --  5.3 5.2  --  4.9  --  4.8  --  5.7*   CHLORIDE  --  114* 115*  --  116*  --  116*  --  110*   CO2  --  17* 15*  --  15*  --  19*  --  19*   ANIONGAP  --  13 15*  --  11  --  8  --  11   * 112* 117* 119* 119*   < > 134*  136*   < > 178*   BUN  --  43* 42*  --  39*  --  32*  --  28   CR  --  2.16* 2.11*  --  1.90*  --  1.59*  --  1.42*   GFRESTIMATED  --  33* 33*  --  38*  --  47*  --  54*   DERRELL  --  7.4* 7.5*  --  7.4*  --  7.1*  --  7.1*   MAG 2.7*  --   --   --  2.0  --  2.3  --  2.8*   PHOS  --   --   --   --   --   --  4.5  --   --    PROTTOTAL  --  5.3* 5.3*  --  5.3*  --  4.9*  --  5.9*   ALBUMIN  --  3.5 3.5  --  3.6  --  2.8*  --  3.0*   BILITOTAL  --  1.1 1.1  --  1.0  --  0.9  --  0.7   ALKPHOS  --  68 72  --  77  --  118  --  151*   AST  --  713* 694*  --  693*  --  797*  --  752*   ALT  --  310* 290*  --  290*  --  367*  --  470*    < > = values in this interval not displayed.     CBC  Recent Labs   Lab 03/17/22  1529 03/17/22  1412 03/17/22  0949 03/17/22  0403   HGB 11.4* 11.4* 11.4* 12.8*   WBC 8.5 8.2 9.9 20.9*   RBC 3.72* 3.69* 3.75* 4.21*   HCT 36.8* 36.1* 36.7* 41.1   MCV 99 98 98 98   MCH 30.6 30.9 30.4 30.4   MCHC 31.0* 31.6 31.1* 31.1*   RDW 14.0 14.0 13.9 13.8    155 160 204      INR  Recent Labs   Lab 03/17/22  1529 03/17/22  1412 03/17/22  0949 03/17/22  0403   INR 1.44* 1.52* 1.46* 1.28*   * 105* 63* 48*     ABG  Recent Labs   Lab 03/17/22  1529 03/17/22  1350 03/17/22  1248 03/17/22  1209   PH 7.14* 7.17* 7.16* 7.16*   PCO2 43 43 43 40   PO2 127* 116* 116* 115*   HCO3 15* 16* 16* 14*   O2PER 60  50  50 50 50 50      URINE STUDIES  Recent Labs   Lab Test 03/16/22  2253   COLOR Light Yellow   APPEARANCE Slightly Cloudy*   URINEGLC Negative   URINEBILI Negative   URINEKETONE Negative   SG 1.011   UBLD Moderate*   URINEPH 5.5   PROTEIN 50 *   NITRITE Negative   LEUKEST Negative   RBCU 9*   WBCU 18*     No lab results found.  PTH  No lab results found.  IRON STUDIES  No lab results found.    IMAGING:  All imaging studies reviewed by me.     Topher Gale MD

## 2022-03-17 NOTE — PROGRESS NOTES
ECLS Cannulation Note:    Date on: 3/16/2022  Time on: 20:34  Cannulating Physician: Zuleyka    Arterial Cannula: 17 Fr. In the Right Femoral Artery      Venous Cannula: 25 Fr. In the Right Femoral Vein      ECMO components include   Quadrox Oxygenator- Lot # [id number]  Circuit Lot Number:[id number]  Console Serial Number: [id number]    Cannulation was performed in the Cath Lab, placement was verified by CXR and fluoroscopy, cannula position is good.    Patient's blood type is A, positive.    RT Hiral  ECMO Specialist  3/17/2022 12:50 AM

## 2022-03-17 NOTE — H&P
[facility]  Interventional Cardiology History & Physical  2022          H&P:   Devonte Wisdom is a 67 year old male who was admitted on 3/16/2022. Patient with PMH of T2DM, HLD, and prior anterior STEMI 21 s/p LISA to mLAD c/b HFrEF 2/2 ICM. Since that time, has been followed by [facility]. Last was seen on 21 at which time was noted to be doing well in rehab though had had some intermittent issues with volume overload. Was maintained on eplerenone, metop succinate, entresto and low dose diuretic with plan to see EP for consideration of CRT-D/P.    Today had cardiac arrest at home. Family found him slumped in chair and performed compressions after calling EMS. On arrival by EMS, findings as below.     Witnessed arest (y/n): y  Home or public location (y/n): home  Bystander CPR (y/n): y  Time of 911 call: ~730PM  Initial rhythm: VF  Did they have intermittent ROSC (y/n): n  # of shocks: 6   Epi:  2 mg  Amio:  300 mg  Bicarb:  1 amps  EtCO2 en route: unknown  O2 sat en route: unknown    Initial rhythm in the cath lab: VF  First AB.91///18  ECMO cannula size: 17Fr arterial, 25Fr venous           Medications:   I have reviewed this patient's current medications    Past Medical History:   Diagnosis Date     Asthma bronchial      HTN (hypertension)      Hypertriglyceridemia      Irritable bowel syndrome      Obesity        Family History   Problem Relation Age of Onset     Depression Mother      Hypertension Father      Asthma Paternal Grandmother      Hypertension Paternal Grandfather      Asthma Brother      Social History     Socioeconomic History     Marital status:      Spouse name: Not on file     Number of children: 2     Years of education: Not on file     Highest education level: Not on file   Occupational History     Occupation: theater      Employer: [employer]   Tobacco Use     Smoking status: Never  Smoker     Smokeless tobacco: Not on file   Substance and Sexual Activity     Alcohol use: Yes     Comment: 1 glass of red wine per day     Drug use: No     Sexual activity: Yes     Partners: Female   Other Topics Concern     Parent/sibling w/ CABG, MI or angioplasty before 65F 55M? Not Asked   Social History Narrative     Not on file     Social Determinants of Health     Financial Resource Strain: Not on file   Food Insecurity: Not on file   Transportation Needs: Not on file   Physical Activity: Not on file   Stress: Not on file   Social Connections: Not on file   Intimate Partner Violence: Not on file   Housing Stability: Not on file            Review of Systems:   Review of systems not obtained due to patient factors - intubation and abnormal mental status            Physical Exam:   @Vitals: Resp 18   BMI= There is no height or weight on file to calculate BMI.   GENERAL APPEARANCE: Intubated, sedated. NAD.HEENT: JVP 7. No icterus, PERRL 2 mm, ETT in place, OG tube in place  CARDIOVASCULAR: rrr, s1, s2, Normal PMI. Pulses dopplerable.  RESP: Coarse bilaterally. Mechanical ventilation.    GASTRO: Soft, bowel sounds hypoactive but present  GENITOURINARY: Houser in place.  EXTREMITIES: Cool, 1+ edema, pulses dopplered, as above. VA ECMO cannulas in right groin, ThermoGard in place  NEURO: Sedated and intubated, Pupils equal and reactive, 2 mm. Fent and Versed for sedation, as below. Some mild facial bruising  INTEGUMENTARY: No rashes. Cannula/Line sites CDI  LINES/TUBES/DRAINS: (noted below) V-A ECMO Cannulas R groin, arterial sheath and ThermoGard in L groin. R radial Arterial line. ETT. OG. Houser Catheter.    Vent Settings:  Resp: 18   O2 Device: Mechanical Ventilator      Arterial Blood Gas:   Recent Labs   Lab 03/16/22  2131 03/16/22 2057 03/16/22 2039 03/16/22 2030   PH 7.27* 7.15* 7.03* 6.91*   PCO2 34* 49* 51* 91*   PO2 252* 69* 73* 52*   HCO3 15* 17* 13* 18*   O2PER 60.0 100.0 100.0 100.0     There were no  vitals filed for this visit.No intake/output data recorded.  Recent Labs   Lab 03/16/22 2131 03/16/22 2057    141   POTASSIUM 4.4 4.5   * 265*     No components found for: URINE   No lab results found in last 7 days.     No data recorded   Recent Labs   Lab 03/16/22 2131 03/16/22 2057 03/16/22 2039 03/16/22 2030 03/16/22 2024   HGB 13.1* 11.8* 10.4* 14.4 12.6*   HCT  --   --   --   --  37*     No lab results found in last 7 days.  Recent Labs   Lab 03/16/22 2131 03/16/22 2057 03/16/22 2039 03/16/22 2030 03/16/22 2024   * 265* 278* 336* 207*       Lines:           Data:   All lab results reviewed    Recent Results (from the past 24 hour(s))   Cardiac Catheterization    Narrative      Out of hospital refractory VF arrest s/p peripheral VA ECMO cannulation.    Coronary angiogram revealed patent mid LAD stent with 50% lesion at   proximal stent edge. Non obstructive CAD in the LCX and RCA.    S/p insertion of L femoral IABP, L femoral cooling catheter and R radial   arterial line.                 Assessment and Plan:   Devonte Wisdom is a 67 year old male who was admitted on 3/16/2022.    Neurology: #Concern for anoxic brain injuryIntubated, sedated, paralyzed. Cooled to 34 degrees.  --continue versed, fentanyl  - RASS goal -4 to -5   -neurocrit consulted   Cardiovascular / Hemodynamics: #Refractory VF/VT cardiac arrest s/p  Peripheral V-A ECMO  #HFrEF 2/2 ICM  #HLD  #HTN  Unclear inciting etiology at present with nonobstructive CAD however does have known HFrEF 2/2 ischemic cardiomyopathy with prior EF read as ~10-20% with no ICD placed which may be nidus for possible VT arrest.  TTE: will obtain in AM  EKG: NSR  --wean pressors/inotropes as able  --hold temp at 34  --ACT goal 180-200  --hold lipitor for now given likely hepatic injury during arrest  --hold PTA HF meds given arrest  --holding beta blocker given shock    Pulmonary: #Acute hypoxic respiratory failure  In setting of  arrest  --wean vent as able  --daily CXR  --Q2h ABGs for now    #Asthma, mild persistent  -duonebs PRN     GI and Nutrition: No known history of GI disease  --monitor BID LFTs  --NPO while cooled - nutrition consult pending feeding tube placement once he is warmed   --bowel regimen - on hold for now due to hypothermia  --GI Prophylaxis: PPI    Renal, Fluid and Electrolytes: Awaiting initial BMP. Prior baseline Cr 1-1.2  --monitor urine output  --maintain K>3 and Mg>2    Infectious Disease: #concern for aspiration PNA  No signs of infection. Leukocytosis c/w arrest. Blood cultures collected.   --vancomycin/zosyn  --daily blood cultures  --monitor for signs of infection given cooling, lines, and leukocytosis   Hematology and Oncology: #Anemia, normocytic  In setting of volume resuscitation, cannulation  Receiving heparin for ECMO and ASA/ticagrelor for LISA.   --cryo PRN fibrinogen < 200; FFP for INR >2  --heparin gtt for ECMO with ACT goal 180-200  --US LE w/ arterial duplex per ECMO protocol   --DVT PPX: Heparin as above   Endocrinology: #T2DM  A1C pending  --insulin gtt as needed for glycemic control  --f/u HgbA1c            Family update by me today: Yes     Code Status: Full    The pt was discussed and evaluated with Eric Lara MD, attending physician, who agrees with the assessment and plan above.     Jen Flores MD  Pager: [phone]  March 16, 2022

## 2022-03-17 NOTE — PROGRESS NOTES
[facility], Procedure Note          Intra-Aortic Balloon Pump Insertion:       Devonte Wisdom  MRN# [id number]   March 16, 2022, 9:10PM Indication: Cardiac Arrest           Procedure performed: March 16, 2022, 9:10PM   Location: Cath Lab   Catheter size: 50cc   Inserted: 6 inch introducer sheath   Catheter placed: Left femoral artery   Complications:: None   Assist initiated: 1:1 ratio   Percent augmentation: 100   Timing adjusted: Adjusted to achieve optimal assist   Verification of position: Fluoroscopy   Comments: None      Recorded by Yusuf Smith

## 2022-03-17 NOTE — PROGRESS NOTES
[facility]  Procedure Note           Intubation:       Devonte Wisdom  MRN# [id number]   March 16, 2022, 8:40 PM Indication: Cardiac arrest           Patient intubated at: March 16, 2022, 8:40 PM       Informed consent: Emergent   Cervical spine: Was stabilized during the procedure   Sedative medication: Was administered during the procedure   Technique used: Fiberoptic visualization with GlideScope   Endotracheal tube size: 8.0 cm with cuff   Number of attempts: 1   Placement confirmed by: Visualization of bilateral chest wall rise  End-tidal CO2 monitor   ET tube repositioning: Was not performed   Tube secured at: 23 cm      This procedure was performed without difficulty and he tolerated the procedure well with no complications.      Recorded by Yusuf ETIENNE

## 2022-03-17 NOTE — CONSULTS
[facility]  WOC Nurse Inpatient Adult Pressure Injury Prevention Assessment: ECMO  Initial     Positioning Tolerance: Fair  Date of ECMO cannulation: 3/16 right groin VA ECMO  Presence of Ischemia: No  Location of ischemia: none    Pressure Injury Prevention Interventions In Place:  Optifoam Dressing under ECMO Cannula, Z flow Positioner under head, Pillows for repositioning, TAPs Wedge Positioners in use, Heel off-loading boots, Pillows under calves for heel suspension, Mepilex Sacral Dressing and Dressing to sacrum for prevention   Current support surface: Standard  Low air loss mattress       Pressure Injury Prevention Interventions Added:  None        Plan of Care for Positioning and Pressure Injury Prevention  Reposition patient every 1-2 hours using TAP Wedges  Position head on Z flow positioner, mold indentation at areas of pressure points.  Pad ECMO IJ cannula with Optifoam (#[id number]) along face and scalp between skin and cannula and under Coban head wraps    Pad ECMO groin and chest cannula under rigid connectors with Optifoam or Soft cloth  Heel off-loading Boots at all times  Sacral Mepilex for Prevention, change every 5 days and prn  Low Air loss mattress    Patient History:   According to medical record: Devonte Wisdom is a 67 year old male who was admitted on 3/16/2022. Patient with PMH of T2DM, HLD, and prior anterior STEMI 6/6/21 s/p LISA to mLAD c/b HFrEF 2/2 ICM. Since that time, has been followed by [facility]. Last was seen on 11/17/21 at which time was noted to be doing well in rehab though had had some intermittent issues with volume overload. Was maintained on eplerenone, metop succinate, entresto and low dose diuretic with plan to see EP for consideration of CRT-D/P.     Today had cardiac arrest at home. Family found him slumped in chair and performed compressions after calling EMS. On arrival by EMS, findings as below.      Witnessed  arest (y/n): y  Home or public location (y/n): home  Bystander CPR (y/n): y  Time of 911 call: ~730PM  Initial rhythm: VF  Did they have intermittent ROSC (y/n): n  # of shocks: 6   Epi:  2 mg  Amio:  300 mg  Bicarb:  1 amps  EtCO2 en route: unknown  O2 sat en route: unknown     Initial rhythm in the cath lab: VF  First AB.//52/18  ECMO cannula size: 17Fr arterial, 25Fr venous     Current Diet / Nutrition:     Orders Placed This Encounter      NPO for Medical/Clinical Reasons Except for: No Exceptions    Output:    I/O last 3 completed shifts:  In: 6934.21 [I.V.:5934.21]  Out: 2735 [Urine:535; Emesis/NG output:2200]  Containment: of urine/stool: Urinary Catheter    Risk Assessment:   Sensory Perception: 1-->completely limited  Moisture: 3-->occasionally moist  Activity: 1-->bedfast  Mobility: 1-->completely immobile  Nutrition: 2-->probably inadequate  Friction and Shear: 2-->potential problem  Camden Score: 10    Labs:  Recent Labs   Lab 22  0403   ALBUMIN 2.8*   HGB 12.8*   INR 1.28*   WBC 20.9*   CRP 3.0       Focused Assessment: right Right groin ECMO cannula, Face and Feet    Pressure Injury Present::No, pt with significant facial trauma POA.     Education provided to: nurse  Discussed importance of:their role in pressure injury prevention  Discussed plan of care with Nurse  WOC Nurse follow-up plan:weekly    Karey Lr RN CWOCN

## 2022-03-17 NOTE — CONSULTS
[facility]  Neurocritical Care Consultation    Patient Name:  Devonte Wisdom  MRN:  [id number]    :  1955  Date of Service:  2022  Primary care provider:  No primary care provider on file.      Neurology consultation service was asked to see Devonte Wisdom by Dr. Gardiner to evaluate post-cardiac arrest.    History of Present Illness:   67 year old man with h/o T2DM, HLD, and prior anterior STEMI s/p LISA who presents with cardiac arrest. Now s/p V-A Ecmo and is being cooled to 34 degrees.    Pt had an unwitnessed cardiac arrest at home.  Family found him slumped in chair and began progressions after calling EMS.    Upon arrival by EMS patient was defibrillated for V Fib, was later noted to be in PEA arrest, then back to Vfib.  ROSC was not achieved.  Pt was eventually transitioned to ANA.     CT head obtained after cath lab with somewhat diminished gray-white differentiation.    Patient remains cooled to 34C and sedated.      ROS  A 10-point ROS was  not performed due to mental status.      PMH  Past Medical History:   Diagnosis Date     Asthma bronchial      HTN (hypertension)      Hypertriglyceridemia      Irritable bowel syndrome      Obesity      Past Surgical History:   Procedure Laterality Date     VITRECTOMY PARSPLANA WITH 23 GAUGE SYSTEM, LENSECTOMY  2012    Procedure: COMBINED VITRECTOMY PARSPLANA, LENSECTOMY;  RIGHT VITRECTOMY PARSPLANA 20 GAUGE, LENSECTOMY;  Surgeon: Chauncey Hercules MD;  Location:  APPENDECTOMY         Medications   Medications Prior to Admission   Medication Sig Dispense Refill Last Dose     aspirin (ASA) 81 MG chewable tablet On HOLD while taking Eliquis x 30 days        benzonatate (TESSALON) 100 MG capsule         brinzolamide-brimonidine (SIMBRINZA) 1-0.2 % ophthalmic suspension 1 drop        clopidogrel (PLAVIX) 75 MG tablet Take 75 mg by mouth        cyclobenzaprine (FLEXERIL) 10 MG tablet Take 10 mg by  mouth        eplerenone (INSPRA) 25 MG tablet Take 25 mg by mouth        latanoprost (XALATAN) 0.005 % ophthalmic solution 1 drop        loratadine (CLARITIN) 10 MG tablet Take 10 mg by mouth        metoprolol succinate ER (TOPROL-XL) 25 MG 24 hr tablet Take 12.5 mg by mouth        albuterol (2.5 MG/3ML) 0.083% nebulizer solution Take 3 mLs by nebulization See Admin Instructions. 225 mL 6      amLODIPine (NORVASC) 5 MG tablet Take 1 tablet by mouth daily.    30 tablet 2      fluticasone (FLOVENT HFA) 220 MCG/ACT inhaler Inhale 1 puff into the lungs 2 times daily. 1 Inhaler 12      hydrocodone-homatropine (HYDROMET) 5-1.5 MG/5ML syrup Take 5 mLs by mouth every 6 hours as needed. 280 mL 0      PROAIR  (90 BASE) MCG/ACT IN AERS INHALE 1 TO 2 PUFFS EVERY 4 TO 6 HOURS AS NEEDED 2 12      valsartan (DIOVAN) 320 MG tablet Take 0.5 tablets by mouth daily. 30 tablet 1        Allergies  Allergies   Allergen Reactions     Amlodipine Swelling     Leg edema - not allergy  Leg edema - not allergy       Contrast Dye      PN: LW CM1: CONTRAST- nka Reaction :     Lisinopril Cough     Dapagliflozin Rash       Social History  Social History     Socioeconomic History     Marital status:      Spouse name: None     Number of children: 2     Years of education: None     Highest education level: None   Occupational History     Occupation: theater      Employer: [employer]   Tobacco Use     Smoking status: Never Smoker     Smokeless tobacco: None   Substance and Sexual Activity     Alcohol use: Yes     Comment: 1 glass of red wine per day     Drug use: No     Sexual activity: Yes     Partners: Female   Other Topics Concern     Parent/sibling w/ CABG, MI or angioplasty before 65F 55M? Not Asked   Social History Narrative     None       Family History    Family History   Problem Relation Age of Onset     Depression Mother      Hypertension Father      Asthma Paternal Grandmother      Hypertension Paternal  "Grandfather      Asthma Brother        Physical Examination   Vitals: Resp 18   Ht 1.778 m (5' 10\")   Wt 123 kg (271 lb 2.7 oz)   SpO2 100%   BMI 38.91 kg/m    General: Adult patient, lying in bed, intubated   Neuro: intubated, examined on sedation. Not following commands. Pupils are 2mm and  fixed, eyes are conjugate. Oculocephalics, corneal, and gag reflexes are NOT intact. Roving eye movements are not observed. No abnormal movements noted. Painful stimuli deferred given sedation.    Investigations   CT Head 3/16  Final read pending    Impression  67 year old man with h/o T2DM, HLD, and prior anterior STEMI s/p LISA who presents with cardiac arrest. Now s/p V-A Ecmo and is being cooled to 34 degrees. Neurocritical care consulted for post-cardiac arrest management/prognostication. Exam is limited by sedation and remains cooled at this time. Initial CT head with somewhat diminished gray-white differentiation.  Recommend starting video EEG when able. We will continue to follow along.    Recommendations  - Continuous EEG in AM  - Wean sedation as able  - Avoid hypotonic fluids  - We will continue to follow along.    Thank you for involving Neurology in the care of Devonte Wisdom.  Please do not hesitate to call with questions/concerns (consult pager [phone]).      Patient was discussed with Dr. Retana.    Dex Servin MD  Neurology Resident, PGY2  "

## 2022-03-17 NOTE — PROGRESS NOTES
ECMO Shift Summary:      ECMO Equipment:  Console serial number: [id number]  Circuit Lot number: [id number]  Oxygenator Lot number: [id number]    Patient remains on VA ECMO, all equipment is functioning and alarms are appropriately set. RPM's 8995-7875 with flow range 3.6-4.3 L/min. Sweep gas is at 5 LPM and FiO2 60%. Circuit remains free of air, clot and fibrin. Cannulas are secure with no bleeding from site. Extremities are cool, pale.    Significant Shift Events: Chugging early in shift, requiring 1 L albumin and 250 mls LR. Lactate increasing, flows increased to ~4.3 L. Rewarming initiated at 1500.    Vent settings:  Vent Mode: CMV/AC  (Continuous Mandatory Ventilation/ Assist Control)  FiO2 (%): 50 %  Resp Rate (Set): 18 breaths/min  Tidal Volume (Set, mL): 400 mL  PEEP (cm H2O): 5 cmH2O  Resp: 18    Heparin is OFF due to ACT up to 237.    Urine output is adequate, blood loss was none. Product given included none.       Intake/Output Summary (Last 24 hours) at 3/17/2022 1811  Last data filed at 3/17/2022 1800  Gross per 24 hour   Intake 9122.39 ml   Output 4795 ml   Net 4327.39 ml       CXR:  Recent Results (from the past 24 hour(s))   Cardiac Catheterization    Narrative      Out of hospital refractory VF arrest s/p peripheral VA ECMO cannulation.    Coronary angiogram revealed patent mid LAD stent with 50% lesion at   proximal stent edge. Non obstructive CAD in the LCX and RCA.    S/p insertion of L femoral IABP, L femoral cooling catheter and R radial   arterial line.      CT Head w/o Contrast    Narrative    CT HEAD W/O CONTRAST 3/16/2022 10:31 PM    History: ECMO   ICD-10:    Comparison: None    Technique: Using multidetector thin collimation helical acquisition  technique, axial, coronal and sagittal CT images from the skull base  to the vertex were obtained without intravenous contrast.   (topogram) image(s) also obtained and reviewed.    Findings: There is no intracranial hemorrhage, mass effect,  or midline  shift. Gray/white matter differentiation in both cerebral hemispheres  is preserved. Ventricles are proportionate to the cerebral sulci. The  basal cisterns are clear.    The bony calvaria and the bones of the skull base are normal. The  visualized portions of the paranasal sinuses and mastoid air cells are  relatively clear.      Impression    Impression:  No acute intracranial pathology. No definite hypoxic  ischemic injury.    I have personally reviewed the examination and initial interpretation  and I agree with the findings.    PALLAVI DORADO MD         SYSTEM ID:  [id number]   CT Chest Abdomen Pelvis w/o Contrast   Result Value    Radiologist flags Peripancreatic fat streakiness,  consider clinical (Urgent)    Narrative    EXAMINATION: CT CHEST ABDOMEN PELVIS W/O CONTRAST  3/16/2022 10:32 PM       CLINICAL HISTORY: ECMO    Additional history from the electronic medical record:67 year old  male?with PMH significant for LBBB with anterior STEMI (6/6/2021) s/p  LISA to mid LAD with associated acute systolic heart failure, diabetes,  dyslipidemia, and obesity?who?presents to the ED via ambulance for an  unwitnessed cardiac arrest    COMPARISON: None        PROCEDURE COMMENTS: CT of the chest, abdomen, and pelvis was performed  without contrast. Axial MIP  images of the chest, and coronal and  sagittal reformatted images of the chest, abdomen, and pelvis  obtained.    FINDINGS:    Support devices: Endotracheal tube tip in the lower thoracic trachea,  about 2 cm above the debora.. Right femoral ECMO catheter sheath with  tip at the superior cavoatrial junction. Left femoral PICC with tip  terminating in the suprarenal IVC at the level of T11. Right femoral  arterial catheter tip terminates in the proximal right common iliac  artery. Left femoral approach intra-aortic balloon pump superior  marker in proximal descending thoracic aorta and inferior marker is in  the infrarenal aorta.    Chest: Exam is  somewhat limited due to motion. Endotracheal tube tip  terminates in the lower thoracic trachea. Bilateral posterior  predominant groundglass and confluent consolidative densities.  Interlobar septal thickening. No pleural effusion. No pneumothorax.  Peripheral nodule in the right middle lobe measuring 6 mm (series 3,  image 159). Mild cardiomegaly. Moderate coronary artery  calcifications.    There are no abnormally sized axillary, hilar, or mediastinal lymph  nodes.    The heart is enlarged. Normal caliber aorta and pulmonary artery.  Normal branching of the great vessels. Moderate to severe  atherosclerotic calcification of the coronary arteries.    Abdomen/pelvis:    Limited evaluation of abdominal parenchymal organs due to non contrast  technique.    Mild gastric distention. The liver and biliary system, spleen,  pancreas are normal in appearance. There is peripancreatic/periportal  fat stranding with few subcentimeter yuan hepatis and peripancreatic  lymph nodes.The adrenal glands and kidneys are normal in appearance.  The urinary bladder is decompressed with Houser in place. The prostate  is mildly enlarged    Mild dilatation of small bowel loops in the pelvis with no transition  point.. Diverticulosis without adjacent inflammatory change. Bilobed  fat-containing umbilical/ventral hernia. Trace fluid along the right  paracolic gutter. There is no free air. The patency of the major  intra-abdominal vasculature cannot be assessed on this noncontrast  exam.    Bones:   No acute or suspicious osseous lesions. Air density in chest wall  vessel/trace subcutaneous emphysema (series 9, image 102). Soft tissue  streakiness in the left inguinal region, may represent post  intervention change/small hematoma (series 9, image 627)      Impression    IMPRESSION: In this patient with history of  cardiac arrest, the  current scan shows:    1. Multiple support devices as above with inferior approach ECMO  venous cannula tip  terminating at the superior cavoatrial junction.  2. Bilateral dependent pulmonary consolidations and groundglass  densities with interlobar thickening may represent pulmonary edema  and/or aspiration/infection.  3. Peripancreatic fat stranding. May consider correlation with  pancreatic enzymes to rule out acute pancreatitis. No peripancreatic  fluid collection.  4. Mild dilatation of small bowel loops in the pelvis. No transition  point, likely representing developing adynamic ileus.  5. Indeterminate 6 mm pulmonary nodule in the right middle lobe. If  patient is at high risk for lung neoplasm, consider attention on  follow-up.  6. Additional incidental findings as described above including a left  inguinal streakiness suggestive of post intervention change, colonic  diverticulosis, mild cardiomegaly, mild gastric distention,  prostatomegaly and fat-containing bilobed ventral/umbilical hernia      [Access Center: Peripancreatic fat streakiness,  consider clinical  correlation for acute pancreatitis.]    This report will be copied to the [facility] to ensure a  provider acknowledges the finding. [facility] Center is available Monday  through Friday 8am-3:30 pm.     I have personally reviewed the examination and initial interpretation  and I agree with the findings.    BRENDA BIRD MD         SYSTEM ID:  [id number]   XR Chest Port 1 View    Narrative    EXAM: XR CHEST PORT 1 VIEW  3/16/2022 11:10 PM     HISTORY:  Check endotracheal tube placement and ECLS cannula  placement.       COMPARISON:  CT 3/16/2022    TECHNIQUE: Portable supine AP radiograph of the chest.    FINDINGS: Endotracheal tube tip projects over the midthoracic trachea.  Gastric tube passes below the diaphragm. Temperature probe tip  projects at the level of the thoracic inlet. Lower approach venous  ECMO cannula tip projects over the superior cavoatrial junction. The  superior marker of the intra-aortic balloon pump projects within 2 cm  of  the superior margin of the descending thoracic aorta. Coronary  artery stents. Circular opacity projecting over the left medial apical  upper chest likely external to the patient. Partially visualized and  22.    The trachea is midline. The cardiac silhouette is enlarged. Diffuse  interstitial opacities with mild patchy airspace opacities  bilaterally. Perihilar and bibasilar opacities. Small right pleural  effusion. No appreciable pneumothorax. Chronic deformity of the right  sixth rib.      Impression    IMPRESSION:   1. Endotracheal tube tip projects over the midthoracic trachea.  2. Lower approach venous ECMO cannula tip projects near the superior  cavoatrial junction.  3. Cardiomegaly with pulmonary edema and basilar opacities  representing atelectasis or consolidation and a small right pleural  effusion.    I have personally reviewed the examination and initial interpretation  and I agree with the findings.    BRENDA BIRD MD         SYSTEM ID:  [id number]   XR Abdomen Port 1 View    Narrative    EXAMINATION:  XR ABDOMEN PORT 1 VIEWS 3/16/2022 11:14 PM     COMPARISON: CT 3/16/2022    HISTORY: og placement    TECHNIQUE: Frontal supine view of the abdomen.    FINDINGS: Gastric tube tip and sidehole project over the stomach.  Lower approach venous ECMO cannula passes above the diaphragm and  beyond the field-of-view. Lower approach central venous catheter tip  projects over the IVC at the level of presumed T11. The inferior  intra-aortic balloon pump marker projects over the presumed L2  vertebral body, assuming 5 lumbar type vertebral bodies. Opacities  projecting over bilateral mid abdomen likely external to the patient.  Monitoring device wires projecting over the abdomen.    The lower abdomen and the right lateral abdomen are collimated from  the field-of-view. Mildly distended transverse colon. No pneumatosis  or portal venous gas.      Impression    IMPRESSION: Gastric tube tip and sidehole project over  the stomach.    I have personally reviewed the examination and initial interpretation  and I agree with the findings.    BRENDA BIRD MD         SYSTEM ID:  [id number]   Echocardiogram Complete    Narrative    [id number]  [id number]  [id number]  [id number]^LISETH^ANASTACIO^QUINCY     [facility],[place]  Echocardiography Laboratory  [address] [place] [address]     Name: JAMAAL BRANNON  MRN: [id number]  : 1955  Study Date: 2022 09:08 AM  Age: 67 yrs  Gender: Male  Patient Location: [facility]  Reason For Study: Heart Failure  Ordering Physician: ANASTACIO CHILDERS  Performed By: JOANA Baker     BSA: 2.4 m2  Height: 70 in  Weight: 271 lb  BP: 78/44 mmHg  ______________________________________________________________________________  Procedure  Complete Portable Echo Adult.  ______________________________________________________________________________  Interpretation Summary  This is a trasnthoracic VA ECMO evaluation at 3.2LPM flow with IABP support at  1:1     The left ventricular ejction fraction is <10% with severe LV dilation.     ECMO cannula seen in the IVC occupying the majority of the vascular space.     Trace to mild aortic insufficiency is present. The aortic valve intermittently  opens with approximatly every second to third cardiac cycle.     Global right ventricular function is severely reduced with preserved right  ventricle size.     Moderate to severe mitral insufficiency is present. The cause of the mitral  regurgitation is likely multifactorial there is restriction of the posterior  leaflet and the size and geometry of the left ventricle are also  increased/distorted. The mitral regurgitant jet is eccentrical and posteriorly  directed.     Echo lucent structure in or overlaying the liver, differential include fluid  filled hepatic cyst, vs overlyaing bowel, consider additional imaging if  clinicall idicated.     No pericardial effusion is present.     There  is no prior study for direct comparison.     Findings were called to the primary team, Tisha Young at 955am on 3/17/2022.  ______________________________________________________________________________  Left Ventricle  The left ventricular ejction fraction is <10% with severe LV dilation.     Right Ventricle  The right ventricle is normal size. Global right ventricular function is  severely reduced.     Atria  The atria cannot be assessed.     Mitral Valve  Moderate to severe mitral insufficiency is present. The cause of the mitral  regurgitation is likely multifactorial there is restriction of the posterior  leaflet and the size and geometry of the left ventricle are also  increased/distorted. The mitral regurgitant jet is eccentrical and posteriorly  directed.     Aortic Valve  The aortic valve intermittently opens with approximatly every second to third  cardiac cycle. Trace to mild aortic insufficiency is present.     Tricuspid Valve  The tricuspid valve cannot be assessed. The peak velocity of the tricuspid  regurgitant jet is not obtainable. Pulmonary artery systolic pressure cannot  be assessed.     Pulmonic Valve  The valve leaflets are not well visualized.     Vessels  Unable to assess mean RA pressure given the patient is on a ventilator.     Pericardium  No pericardial effusion is present.     Miscellaneous  This is a trasnthoracic VA ECMO evaluation at 3.2LPM flow with IABP support at  1:1     Echo lucent structure in or overlaying the liver, differential include fluid  filled hepatic cyst, vs overlyaing bowel, consider additional imaging if  clinicall idicated.     Compared to Previous Study  There is no prior study for direct comparison.  ______________________________________________________________________________  MMode/2D Measurements & Calculations  IVSd: 0.94 cm  LVIDd: 7.3 cm  LVIDs: 7.2 cm  LVPWd: 1.0 cm  FS: 1.6 %  LV mass(C)d: 333.8 grams  LV mass(C)dI: 140.6 grams/m2  RWT: 0.27      ______________________________________________________________________________  Report approved by: Pop White 03/17/2022 10:00 AM         US Carotid Bilateral    Narrative    Exam: Bilateral carotid duplex Doppler ultrasound dated 82    Clinical history: Cardiac Arrest on ECMO    Comparison Study: None    Ordering provider: Mark    Technique: Grayscale (B-mode) and duplex and spectral Doppler  ultrasound of the extracranial internal carotid, external carotid,  vertebral artery origins, right brachiocephalic/subclavian and left  subclavian arteries. Velocity measurements obtained with angle  correction at or less than 60 degrees.    Findings:    RIGHT SIDE:     Plaque Morphology: Predominantly echogenic, Smooth       Proximal CCA: 114/40 cm/sec     Mid CCA: 82/19 cm/sec     Distal CCA: 82/18 cm/sec     External CA: 72 cm/sec       Proximal ICA: 87/27 cm/sec     Mid ICA: 108/26 cm/sec     Distal ICA: 92/24 cm/sec       Vertebral artery: 84 cm/sec       ICA/CCA ratio: 1.33    LEFT SIDE:     Plaque Morphology: Predominantly echogenic, Smooth       Proximal CCA: 127/41 cm/sec     Mid CCA: 112/44 cm/sec     Distal CCA: 92/56 cm/sec     External CA: 73/10 cm/sec       Proximal ICA: 70/21 cm/sec     Mid ICA: 100/66 cm/sec     Distal ICA: 124/82 cm/sec       Vertebral artery: 89/58 cm/sec        ICA/CCA ratio: 1.35      Impression    IMPRESSION:    1. Right side:        Degree of stenosis of the internal carotid artery: Less than 50 %.    2. Left side:         Degree of stenosis of the internal carotid artery: Less than 50 %.    3. Waveforms compatible with ECMO.    [employer] Updated Recommendations for  Carotid Stenosis Interpretation Criteria - October 2021.  https://intersocietal.org/wp-content/uploads/2021/10/IAC-Vascular-Test  xd-Wwmckiufzkmlo_Vlmprxg-Pdbnyztzrpvokyt-for-Carotid-Stenosis-Interpre  ation-Criteria.pdf         Normal            ICA PSV: < 180 cm/s             Plaque Estimate: None            ICA/CCA PSV Ratio: < 2.0            ICA EDV: < 40 cm/s         < 50%            ICA PSV: < 180 cm/s            Plaque Estimate: < 50%            ICA/CCA PSV Ratio: < 2.0            ICA EDV: < 40 cm/s         50 - 69%            ICA PSV: < 180 - 230 cm/s            Plaque Estimate: > 50%            ICA/CCA PSV Ratio: 2.0 - 4.0            ICA EDV: 40 - 100 cm/s         > 70% but less than near occlusion            ICA PSV: > 230 cm/s            Plaque Estimate: > 50%            ICA/CCA PSV Ratio: > 4.0            ICA EDV: > 100 cm/s         Near occlusion            ICA PSV: > 230 cm/s            Plaque Estimate: Visible            ICA/CCA PSV Ratio: Variable            ICA EDV: Variable         Total occlusion            ICA PSV: Undetectable            Plaque Estimate: Visible, no detectable lumen            ICA/CCA PSV Ratio: N/A            ICA EDV: N/A    I have personally reviewed the examination and initial interpretation  and I agree with the findings.    GEE HERNÁNDEZ MD         SYSTEM ID:  [id number]   US Lower Extremity Arterial Duplex Bilateral    Narrative    Exam: Duplex ultrasound of bilateral lower extremity arteries dated  3/17/2022 11:04 AM     Clinical information: Baseline to assess blood flow to Lower  Extremities (VA ECMO)     Comparison: CT abdomen 3/16/2022    Technique: Grayscale (B-mode), color Doppler, and duplex spectral  Doppler ultrasound of the lower extremity arteries. Velocity  measurements obtained with angle correction of 60 degrees or less.    Ordering provider: Mark    Findings:     Right lower extremity:     Common femoral artery: Nonvisualized secondary to ECMO.  Profunda femoral artery: Nonvisualized secondary to ECMO.  Proximal SFA: Not visualized secondary to ECMO  Mid SFA:Velocity:  35 cm/sec. Waveforms: Consistent with ECMO  Distal SFA: Velocity: 21 cm/sec. Waveforms: Consistent with ECMO    Popliteal artery: Velocity: 15 cm/sec. Waveforms:  Consistent with ECMO    PTA ankle: Velocity: 28 cm/sec. Waveforms: Consistent with ECMO  HUI ankle: Velocity: 16 cm/sec. Waveforms: Consistent with ECMO    Left lower extremity:    Common femoral artery: Nonvisualized secondary to line placement.  Deep femoral artery: Velocity: Not visualized secondary to line  placement  Proximal SFA: Velocity: 106 cm/sec. Waveforms: Triphasic  Mid SFA: Velocity: 74 cm/sec. Waveforms: Triphasic  Distal SFA: Velocity: 50 cm/sec. Waveforms: Triphasic    Popliteal artery: Velocity: 39 cm/sec. Waveforms: Biphasic    PTA ankle: Velocity: 52 cm/sec. Waveforms: Biphasic  HUI ankle: Velocity: 45 cm/sec. Waveforms: Biphasic      Impression    Impression:     1. Right leg: Dopplerable flow in the right leg.  Waveforms consistent  with ECMO cannulation.    2. Left leg: Dopplerable flow in the left leg..  Predominantly  triphasic and biphasic waveforms.        Guidelines:    [facility] duplex criteria for lower limb arterial  occlusive disease    Percent stenosis:     Normal (1-19%): Peak systolic velocity (cm/s): <150, End-diastolic  velocity (cm/s): <40, Velocity ratio (Vr): <1.5, Distal arterial  waveform: Triphasic    20-49%: Peak systolic velocity (cm/s): 150-200, End-diastolic velocity  (cm/s): <40, Velocity ratio (Vr): 1.5-2.0, Distal arterial waveform:  Triphasic    50-75%: Peak systolic velocity (cm/s): 200-300, End-diastolic velocity  (cm/s): <90, Velocity ratio (Vr): 2.0-3.9, Distal arterial waveform:  Poststenotic turbulence distal to stenosis, monophasic distal waveform    >75%: Peak systolic velocity (cm/s): >300, End-diastolic velocity  (cm/s): <90, Velocity ratio (Vr): >4.0, Distal arterial waveform:  Dampened distal waveform and low PSV/EDV* in the stenosis    Occlusion: Absent flow by color Doppler/pulsed Doppler spectral  analysis; length of occlusion estimated from distance between exit and  reentry collateral arteries    *PSV = peak systolic velocity, EDV  = end-diastolic velocity  http://link.alas.com/chapter/10.1007/[email or web address]_23/fulltext  html    I have personally reviewed the examination and initial interpretation  and I agree with the findings.    GEE HERNÁNDEZ MD         SYSTEM ID:  [id number]       Labs:  Recent Labs   Lab 03/17/22  1756 03/17/22  1529 03/17/22  1350 03/17/22  1248   PH 7.14* 7.14* 7.17* 7.16*   PCO2 37 43 43 43   PO2 111* 127* 116* 116*   HCO3 13* 15* 16* 16*   O2PER 50 60  50  50 50 50       Lab Results   Component Value Date    HGB 11.4 (L) 03/17/2022    PHGB 30 (H) 03/17/2022     03/17/2022    FIBR 282 03/17/2022    INR 1.44 (H) 03/17/2022     (H) 03/17/2022    DD 3.25 (H) 03/17/2022    ANTCH 81 (L) 03/17/2022         Plan is to start CRRT and continue ECMO support, wean sweep as tolerated. Possible CT scan when normothermic to assess increasing lactate.      Lety Bradshaw, RT  ECMO Specialist  3/17/2022 6:11 PM

## 2022-03-17 NOTE — PROGRESS NOTES
[facility]   Critical Care Cardiology - Progress Note    Devonte Wisdom MRN: [id number]  Age: 67 year old, : 1955  Date: 3/17/2022    Assessment and Plan:  Devonte Wisdom is a 67 year old male who was admitted on 3/16/2022. Patient with PMH of T2DM, HLD, and prior anterior STEMI 21 s/p LISA to mLAD c/b HFrEF 2/2 ICM. Since that time, has been followed by [facility]. Last was seen on 21 at which time was noted to be doing well in rehab though had some intermittent issues with volume overload. Was maintained on Eplerenone, metop succinate, entresto and low dose diuretic with plan to see EP for consideration of CRT-D/P.     Today had cardiac arrest at home. Family found him slumped in chair and performed compressions after calling EMS. On arrival by EMS, findings as below.     Today's Plan:  - reduce sedation as able  - rewarm starting at 22:30 this evening  - hold amiodarone  - resume DAPT  - RUQ US given finding on echocardiogram  - consider renal consult today  - MRSA today  - hemoglobin A1c today    Neurology  # Concern for anoxic brain injury  Intubated, sedated. Cooled to 34 degrees. Initial head CT without acute intracranial pathology  - hold temp at 34; rewarm start at 22:30 this evening.  0.5C per hour  - continue versed, fentanyl  - RASS goal -4 to -5   - neurocrit consulted  - permissive hypernatremia  - permissive hypercapnia    Cardiovascular  # Refractory VF/VT cardiac arrest s/p  Peripheral V-A ECMO  # Anterior STEMI 2021 (delayed STEMI, PCI to mLAD)  # HFrEF 2/2 ICM, dilated cardiomyopathy  # Severe MR  # Trace/Mild AI  # HLD  # HTN  Unclear inciting etiology at present with nonobstructive CAD however does have known HFrEF 2/2 ischemic cardiomyopathy with prior EF read as ~10-20% with no ICD placed which may be nidus for possible VT arrest.  TTE: dilated LV.  Severe MR. Trace/mild AI.  EKG: NSR  - stop amiodarone given bradycardia,  hypotension  - MRI when able  - ECMO Cares   - Flow: 3.5L   - Sweep: 5L   - ACT Goal: 180-200  - goal directed medical therapy   - DAPT: 81mg ASA, 75mg Plavix    - Statin: hold for now given LFT elevation    - BB: hold for now given pressor need    - ACE: hold for now given probably renal injury    Pulmonary  # Acute hypoxic respiratory failure  # Presumed Aspiration Pneumonia  # Hx of Asthma  Vent settings: CMV 18/400/5/50%  AB.24/38/153/16  - wean vent as able  - daily CXR  - Q2h ABGs for now  - duonebs PRN    Gastrointestinal, Nutrition  # Shock Liver  # Overlying Liver Hypodensity  ALT trending down, AST still elevated. Something overlying liver on echocardiogram 3/17. Nothing on CT scan.  - RUQ US  - monitor BID LFTs  - NPO while cooled - nutrition consult pending feeding tube placement once he is warmed   - bowel regimen - on hold for now due to hypothermia    Renal, Electrolytes  # Acute Renal Injury  Unknown baseline.  On arrival, creatinine 1.42.  - urine output tapering off; consider renal consult later today  - follow creatinine closely  - strict I and O's    Infectious Disease  # concern for aspiration PNA  # Leukocytosis  WBC 20.9. Afebrile overnight although cooled Blood cultures collected.   - MRSA culture today  - daily blood culture  - culture history:   - 3/16: gram positive cocci  - antibiotic history:   - vancomycin 3/16 - present   - zosyn 3/16 - present    Hematology  # Anemia, normocytic  In setting of volume resuscitation, cannulation  - cryo PRN fibrinogen < 200; FFP for INR >2  - heparin gtt for ECMO with ACT goal 180-200  - US LE w/ arterial duplex per ECMO protocol   - DVT PPX: Heparin as above    Endocrinology  # T2DM  A1C pending  - insulin gtt as needed for glycemic control  - f/u HgbA1c     MSK/Skin  No active issues    Pertinent Lines  L femoral CVC  Right radial arterial line  17F RFA  25F RFV  8F RSFA  9F LSFA (IABP)  Mik TANNER  ETT    ICU Cares:  CXR: ETT 3cm from debora.  IABP well placed. Pulmonary edema appreciated.  Broad heart border  Fluids/Feeds: TF not indicated while cooled. Fluids not indicated  DVT Prophylaxis: heparin ggt  GI Prophylaxis: protonix  Bowel Regimen: hold while cooled  Anticipated Floor Transfer: N/A    I spent 90 minute face-to-face or coordinating care of Devonte Wisdom. 50 minutes spent preforming chart review. 20 minutes on documentation. 20 minutes on family update. Over 50% of our time on the unit was spent counseling the patient and/or coordinating care regarding cardiac arrest.    Family Update: updated by me via telephone    Patient seen and discussed with Dr. Eric Gardiner.  Assessment and plan as above.    Lexus Yuong PA-C  Critical Care Cardiology  Pager: [phone]      Interval History:  Chugging overnight. Stable.      Objective Findings:  Temp:  [91.6  F (33.1  C)-94.1  F (34.5  C)] 92.8  F (33.8  C)  Pulse:  [42-74] 42  Resp:  [17-24] 18  MAP:  [64 mmHg-84 mmHg] 70 mmHg  Arterial Line BP: ()/(45-58) 94/47  FiO2 (%):  [50 %] 50 %  SpO2:  [96 %-100 %] 100 %    I/O:  Intake/Output Summary (Last 24 hours) at 3/17/2022 1133  Last data filed at 3/17/2022 1100  Gross per 24 hour   Intake 8389.37 ml   Output 2795 ml   Net 5594.37 ml     Physical Exam:  GEN: intubated, sedated  HEENT: no cranial trauma. Pupils 3mm, reactive  Pulm: seemingly CTAB.   Cardiac: regular rate/rhythm. No murmur, rub, gallop  GI: soft, non distended  Neuro: sedated.  Pupils reactive  Integument: no appreciable skin breakdown  Vascular: LE warm, well perfused      Medications:    artificial tears   Both Eyes Q8H     pantoprazole (PROTONIX) IV  40 mg Intravenous Daily     piperacillin-tazobactam  3.375 g Intravenous Q6H     vancomycin  20 mg/kg Intravenous Q24H       albumin human       amiodarone 1 mg/min (03/17/22 0800)     cisatracurium       dextrose       EPINEPHrine       fentaNYL 200 mcg/hr (03/17/22 0800)     heparin (PRESSURE BAG) 2 unit/mL in 0.9% NaCl 6  Units/hr (03/17/22 0800)     HEParin 500 Units/hr (03/17/22 0800)     insulin regular       midazolam 8 mg/hr (03/17/22 0800)     norepinephrine 0.06 mcg/kg/min (03/17/22 0800)     vasopressin 3 Units/hr (03/17/22 0800)         Labs:   CMP  Recent Labs   Lab 03/17/22  0606 03/17/22  0404 03/17/22 0403 03/16/22 2346 03/16/22 2344 03/16/22 2343 03/16/22 2131 03/16/22 2057   NA  --   --  143  --  140  --  141 141   POTASSIUM  --   --  4.8  --  5.7*  --  4.4 4.5   CHLORIDE  --   --  116*  --  110*  --   --   --    CO2  --   --  19*  --  19*  --   --   --    ANIONGAP  --   --  8  --  11  --   --   --    * 128* 134*  136*   < > 178*   < > 230* 265*   BUN  --   --  32*  --  28  --   --   --    CR  --   --  1.59*  --  1.42*  --   --   --    GFRESTIMATED  --   --  47*  --  54*  --   --   --    DERRELL  --   --  7.1*  --  7.1*  --   --   --    MAG  --   --  2.3  --  2.8*  --   --   --    PHOS  --   --  4.5  --   --   --   --   --    PROTTOTAL  --   --  4.9*  --  5.9*  --   --   --    ALBUMIN  --   --  2.8*  --  3.0*  --   --   --    BILITOTAL  --   --  0.9  --  0.7  --   --   --    ALKPHOS  --   --  118  --  151*  --   --   --    AST  --   --  797*  --  752*  --   --   --    ALT  --   --  367*  --  470*  --   --   --     < > = values in this interval not displayed.     CBC  Recent Labs   Lab 03/17/22 0403 03/16/22 2344 03/16/22 2131 03/16/22  2057 03/16/22  2030 03/16/22  2024   WBC 20.9* 17.9*  --   --   --   --    RBC 4.21* 4.94  --   --   --   --    HGB 12.8* 15.2 13.1* 11.8*   < > 12.6*   HCT 41.1 47.5  --   --   --  37*   MCV 98 96  --   --   --   --    MCH 30.4 30.8  --   --   --   --    MCHC 31.1* 32.0  --   --   --   --    RDW 13.8 13.7  --   --   --   --     227  --   --   --   --     < > = values in this interval not displayed.     Arterial Blood Gas  Recent Labs   Lab 03/17/22  0802 03/17/22  0606 03/17/22  0404 03/17/22  0403 03/17/22  0203   PH 7.24* 7.24* 7.15*  --  7.22*   PCO2 38 39 53*  --   48*   PO2 153* 148* 87  --  120*   HCO3 16* 17* 19*  --  20*   O2PER 50 50 50 60  50 50         Pertinent Imaging Studies:  Coronary angiogram:     Out of hospital refractory VF arrest s/p peripheral VA ECMO cannulation.    Coronary angiogram revealed patent mid LAD stent with 50% lesion at proximal stent edge. Non obstructive CAD in the LCX and RCA.    S/p insertion of L femoral IABP, L femoral cooling catheter and R radial arterial line.    Echo:   This is a trasnthoracic VA ECMO evaluation at 3.2LPM flow with IABP support at 1:1  The left ventricular ejction fraction is <10% with severe LV dilation.  ECMO cannula seen in the IVC occupying the majority of the vascular space.  Trace to mild aortic insufficiency is present. The aortic valve intermittently opens with approximatly every second to third cardiac cycle.  Global right ventricular function is severely reduced with preserved right ventricle size.  Moderate to severe mitral insufficiency is present. The cause of the mitral regurgitation is likely multifactorial there is restriction of the posterior leaflet and the size and geometry of the left ventricle are also increased/distorted. The mitral regurgitant jet is eccentrical and posteriorly directed.  Echo lucent structure in or overlaying the liver, differential include fluid filled hepatic cyst, vs overlyaing bowel, consider additional imaging if clinicall idicated.  No pericardial effusion is present.      Lexus Young PA-C  Critical Care Cardiology  Pager: [phone]

## 2022-03-17 NOTE — PROGRESS NOTES
Admission/Transfer from: cath lab  2 RN skin assessment completed. Yes, by Becca CAM RN,and Manjeet LINARES RN  Significant findings include: Bruising on nose and eyes. ECMO cannulas in R femoral, IABP in L. Blanchable redness on coccyx.   [facility] Nurse Consult Ordered? No

## 2022-03-17 NOTE — PLAN OF CARE
Goal Outcome Evaluation:        Changes this shift: Arrived from cath lab at 2230. Cooled to 34 at that time. VA ECMO sweep 5, flows 3.5, speed 3400. Frequent chugging overnight. Total of 3L LR, 1 L NS, and 1L albumin given overnight. No blood products needed. Sedated on 8 of versed and 200 fentanyl, boluses given for overbreathing vent. Currently on 3 vaso and 0.08 levo to maintain map >65. IABP 1:1, 100%. Amio gtt continues at 1, no VT noted overnight. Pupils not reactive at 0000, L pupil reactive at 0400. Facial trauma due to fall. Oozing from cannulas and thermoguard. -140, no insulin gtt needed. Urine output decreasing overnight. Family at bedside during evening.     Plan: EEG to be placed this AM. Rewarming at 2230. Continue to monitor and notify MD with pertinent changes.

## 2022-03-17 NOTE — ANESTHESIA PROCEDURE NOTES
Airway       Patient location: cath lab.       Procedure Start/Stop Times: 3/16/2022 8:40 PM  Staff -        CRNA: Merissa Dawn APRN CRNA       Performed By: CRNA  Consent for Airway        Urgency: emergent       Consent: The procedure was performed in an emergent situation.  Indications and Patient Condition       Indications for airway management: airway protection, altered level of consciousness, cardiovascular arrest, hemodynamic instability and respiratory insufficiency       Induction type:other (comments) (unresponsive, versed given by cath lab team)       Mask difficulty assessment: 0 - not attempted    Final Airway Details       Final airway type: endotracheal airway       Successful airway: ETT - single  Endotracheal Airway Details        ETT size (mm): 8.0       Cuffed: yes       Successful intubation technique: video laryngoscopy       Grade View of Cords: 1 (with cricoid pressure)       Adjucts: stylet       Position: Center       Measured from: gums/teeth       Secured at (cm): 23    Post intubation assessment        Placement verified by: capnometry, equal breath sounds, chest rise and x-ray        Number of attempts at approach: 1       Secured with: commercial tube mike       Ease of procedure: easy       Dentition: Intact and Unchanged

## 2022-03-18 NOTE — PLAN OF CARE
Goal Outcome Evaluation:    Plan of Care Reviewed With: kapil    Unresponsive, pupil right 2mm fixed, left 2mm sluggish reactive, on EEG. Off versed, on fentanyl 100mcg/hour.  On norepi and vasopressin to keep MAP 65mmHg, maintains on VA ECMO flow up to 4.3. chugging this morning with albumin 1L and  through circuit given.  and IABP 1:1.  HR 30s this morning, amio discontinued, now HR 50s SB with BBB.  Ventilated on a/c mode same settings.  Climbing lactic acid 7s, pH 7.17, NaHCO3 2 amp given with no effect, started CRRT. With post filter bicarb solution.  Rewarming started at 1500. Will do CT scan Abd once rewarm to rule out ischemic bowel.

## 2022-03-18 NOTE — PROGRESS NOTES
Critical Care Cardiology - Progress Note    Devonte Wisdom MRN: [id number]  Age: 67 year old, : 1955  Date: 3/17/2022     Brief HPI:  Devonte Wisdom is a 67 year old male who was admitted on 3/16/2022. Patient with PMH of T2DM, HLD, and prior anterior STEMI 21 s/p LISA to mLAD c/b HFrEF 2/2 ICM. Since that time, has been followed by [facility]. Last was seen on 21 at which time was noted to be doing well in rehab though had some intermittent issues with volume overload. Was maintained on Eplerenone, metop succinate, entresto and low dose diuretic with plan to see EP for consideration of CRT-D/P.    On day of admission (3/17), had cardiac arrest at home. Family found him slumped in chair and performed compressions after calling EMS. On arrival by EMS found patient initial rhtyhm to be VF, shocked 6 times. Amio, Epi and Bicarb given. Patient was taken to the CCL where he underwent coronary angiogram and placed on VA ECMO 17Fr/25Fr. Angiogram revealed patent mid LAD stent with 50% lesion at proximal stent edge and non obstructive CAD in the LCX and RCA, no coronary interventions performed. IABP and cooling catheter were placed and was transferred to ICU for further care. LVEF < 10%, severely dilated LV.  Mod-severe MR with restriction of the posterior  leaflet . Trace/mild AI.    Subjective and Interval:  Remains intubated and sedated. No acute events overnight. Did require 750 ml Albumin for chugging. Started rewarming last evening 0. Yesterday, stopped Versed, held amiodarone, resumed DAPT, consulted Renal, sent MRSA nares and added on Hemoglobin A1c    Assessment and Plan  by system  Today's Plan:  - Care conference with family   - Stop Vanc  - Stop Fentanyl, add PO oxy  - Nephro consult  - RUQ ultrasound with and without doppler both for portal thrombus/infarct and given finding on echocardiogram, (structure in or overlaying the liver,eval for fluid filled hepatic cyst, vs  overlyaing bowel  - CT scan for climbing lactate  - Increase Norepi for periods of PEA    Neurology  # Concern for anoxic brain injury  Intubated, sedated. Cooled to 34 degrees. Initial head CT without acute intracranial pathology  - s/p therapeutic hypothermia.   - Stop sedation. If family transitions to comfort will place comfort care order set.  - RASS goal -3  - vEEG  - neurocrit consulted, appreciate recommendations  - permissive hypernatremia  - permissive hypercapnia    Sedation:  Fentanyl @ 100 mcg/hour  Versed off    Cardiovascular  # Refractory VF/VT cardiac arrest s/p peripheral V-A ECMO  # Hx of anterior STEMI 2021 (delayed STEMI, PCI to mLAD)  # CAD  # HFrEF 2/2 ICM, dilated cardiomyopathy  # Mod-severe MR  # Trace/Mild AI  # HLD  # HTN  Probable nidus reduced EF. Nonobstructive CAD on angio this admission. Known HFrEF 2/2 ischemic cardiomyopathy with prior EF read as ~10-20% and no ICD   TTE: LVEF < 10%, severely dilated LV.  Mod-severe MR with restriction of the posterior  leaflet . Trace/mild AI.  EKG: NSR, LBBB  - stopped amiodarone 3/17 given bradycardia, hypotension  - MRI when able    Pressors/Inotropes/Aniarrythmics:  0.18 mcg/kg/mn Norepi  2 units/hour Vaso    Troponin remains > 125K  - ECMO Cares   - Flow:4.3 LPM   - Sweep: 7 L   - ACT Goal: 180-200   - Fio2 on circuit: 100%   - IABP 1:1  - goal directed medical therapy   - DAPT: 81mg ASA, 75mg Plavix    - Statin: hold for now given LFT elevation    - BB: hold for now given pressor need    - ACE: hold for now given probably renal injury    Pulmonary  # Acute hypoxic respiratory failure  # Presumed Aspiration Pneumonia  # Hx of Asthma  Vent settings: CMV 18/400/5/50%  AB.15/43/143/15  - wean vent as able  - daily CXR; today's worsening opacities and congestion bilaterally  - Serial ABGs   - duonebs PRN  - Antibiotics as below    Gastrointestinal, Nutrition  # Acute liver injury  # Overlying Liver Hypodensity  ALT drastic increase,ALT  952--> 2912, AST 1579-->3927  - Abdominal ultrasound today to both eval for portal infarct/thrombus, as well as follow up something overlying liver on echocardiogram 3/17. Nothing on CT scan.  - monitor BID LFTs  - NPO while cooled - nutrition consult pending feeding tube placement once he is warmed   - bowel regimen - on hold for now due to hypothermia    Renal, Electrolytes  # Acute Renal Injury requiring CRRT  # Lactic acidosis  Nephrology consulted, CRRT initiated  Remove fluid as able    I/O's:  yesterday, 8 ml since midnight, 3.3 L net positive yesterday, net + 4.1 L since admission  Unknown baseline.  On arrival, creatinine 1.42. Crt 2.09  - urine output tapering off; consider renal consult later today  - follow creatinine closely  - strict I and O's    Infectious Disease  # concern for aspiration PNA  # Leukocytosis  WBC 9.9. Afebrile, though just rewarmed and on CRRT   Blood cultures collected.   - MRSA negative  - daily blood culture  - culture history:   - 3/16: gram positive cocci sputum   - MRSA negative  - antibiotic history:   - vancomycin 3/16 - present; stop today   - zosyn 3/16 - present    Hematology  # Anemia of critical illness, acute blood loss  Hgb 10.4 (10.6)  - cryo PRN fibrinogen < 200; FFP for INR >2  - heparin gtt for ECMO with ACT goal 180-200  - US LE w/ arterial duplex with flow  - DVT PPX: Heparin as above    Endocrinology  # T2DM  A1C pending  - insulin gtt as needed for glycemic control  - f/u HgbA1c     MSK/Skin  No active issues    Pertinent Lines  L femoral CVC  Right radial arterial line  17F RFA  25F RFV  8F RSFA  9F LSFA (IABP)  Mik TANNER  ETT    ICU Cares:  CXR: ETT 3cm from debora. IABP well placed. Increased opacities and pulmonary edemaBroad heart border  Fluids/Feeds: TF not indicated while cooled. Start today, fluids not indicated  DVT Prophylaxis: heparin gtt  GI Prophylaxis: protonix  Bowel Regimen: hold while cooled, start today  Anticipated Floor Transfer:  N/A    I spent 90 minute face-to-face or coordinating care of Devonte Wisdom. 50 minutes spent preforming chart review. 20 minutes on documentation. 20 minutes on family update. Over 50% of our time on the unit was spent counseling the patient and/or coordinating care regarding cardiac arrest.    Family Update: updated by me    Patient seen and discussed with staff Physician    AMBAR Luis  Cardiology ICU    Objective Findings:  Temp:  [92.7  F (33.7  C)-97.9  F (36.6  C)] 97.3  F (36.3  C)  Pulse:  [36-67] 64  Resp:  [0-25] 19  MAP:  [59 mmHg-78 mmHg] 68 mmHg  Arterial Line BP: ()/(38-56) 97/46  FiO2 (%):  [50 %] 50 %  SpO2:  [55 %-100 %] 100 %      Intake/Output Summary (Last 24 hours) at 3/18/2022 0734  Last data filed at 3/18/2022 0700  Gross per 24 hour   Intake 4477.19 ml   Output 4305 ml   Net 172.19 ml       Physical Exam:  GEN: intubated, sedated  HEENT: Lots of trauma to face, periorbital swelling and echymosis, Pupils 3mm, reactive, L> R  Pulm:Mechanical ventilation, CTAB.   Cardiac: regular rate/rhythm. No murmur, rub, gallop  GI: soft, non distended  Neuro: sedated.  Pupils reactive  Integument: no appreciable skin breakdown  Vascular: LE cool, Left cooler than right    Medications:    artificial tears   Both Eyes Q8H     aspirin  81 mg Oral Daily     clopidogrel  75 mg Oral Daily     pantoprazole (PROTONIX) IV  40 mg Intravenous Daily     piperacillin-tazobactam  4.5 g Intravenous Q6H     vancomycin  2,000 mg (central catheter) Intravenous Q24H       albumin human       dextrose 1,000 mL (03/18/22 0419)     CRRT replacement solution 12.5 mL/kg/hr (03/18/22 0501)     fentaNYL 100 mcg/hr (03/18/22 0400)     heparin (PRESSURE BAG) 2 unit/mL in 0.9% NaCl 6 Units/hr (03/18/22 0400)     HEParin 300 Units/hr (03/18/22 0400)     insulin regular       midazolam Stopped (03/17/22 1300)     - MEDICATION INSTRUCTIONS -       norepinephrine 0.24 mcg/kg/min (03/18/22 1439)     POST-filter replacement  solution for CVVHD & CVVHDF (Sodium Bicarbonate in water 150 mEq/L for infusion) 200 mL/hr at 03/17/22 1807     CRRT replacement solution 12.5 mL/kg/hr (03/18/22 0501)     vasopressin 4 Units/hr (03/18/22 0447)         Labs:   [facility]  Recent Labs   Lab 03/18/22  0445 03/18/22  0400 03/18/22  0359 03/17/22  2300 03/17/22  2200 03/17/22 2111 03/17/22 2037 03/17/22 2002 03/17/22  1529 03/17/22  1412 03/17/22  1246 03/17/22  0404 03/17/22  0403   NA  --  141  --   --  144  --  144  --   --  144 145*   < > 143   POTASSIUM  --  4.9  --   --  7.3*  4.6  --  5.0   < >  --  5.3 5.2   < > 4.8   CHLORIDE  --  108  --   --  112*  --  113*  --   --  114* 115*   < > 116*   CO2  --  16*  --   --  16*  --  15*  --   --  17* 15*   < > 19*   ANIONGAP  --  17*  --   --  16*  --  16*  --   --  13 15*   < > 8   * 58*  59* 60*   < > 105*  98  99   < > 82   < > 103* 112* 117*   < > 134*  136*   BUN  --  38*  --   --  43*  --  46*  --   --  43* 42*   < > 32*   CR  --  2.09*  --   --  2.27*  --  2.44*  --   --  2.16* 2.11*   < > 1.59*   GFRESTIMATED  --  34*  --   --  31*  --  28*  --   --  33* 33*   < > 47*   DERRELL  --  8.2*  --   --  8.8  --  8.0*  --   --  7.4* 7.5*   < > 7.1*   MAG  --  2.2  --   --  2.4*  --  2.5*  --  2.7*  --   --    < > 2.3   PHOS  --  7.1*  --   --   --   --  7.2*  --   --   --   --   --  4.5   PROTTOTAL  --  5.4*  --   --  5.0*  --   --   --   --  5.3* 5.3*   < > 4.9*   ALBUMIN  --  3.3*  --   --  3.2*  --  3.3*  --   --  3.5 3.5   < > 2.8*   BILITOTAL  --  1.6*  --   --  1.6*  --   --   --   --  1.1 1.1   < > 0.9   ALKPHOS  --  93  --   --  71  --   --   --   --  68 72   < > 118   AST  --  3,927*  --   --  1,579*  --   --   --   --  713* 694*   < > 797*   ALT  --  2,912*  --   --  952*  --   --   --   --  310* 290*   < > 367*    < > = values in this interval not displayed.     CBC  Recent Labs   Lab 03/18/22  0400 03/17/22  2200 03/17/22  1529 03/17/22  1412   WBC 9.9 7.8 8.5 8.2   RBC 3.45* 3.48* 3.72*  3.69*   HGB 10.4* 10.6* 11.4* 11.4*   HCT 34.2* 34.2* 36.8* 36.1*   MCV 99 98 99 98   MCH 30.1 30.5 30.6 30.9   MCHC 30.4* 31.0* 31.0* 31.6   RDW 14.3 14.1 14.0 14.0   * 143* 151 155     Arterial Blood Gas  Recent Labs   Lab 03/18/22  0400 03/18/22  0200 03/17/22  2355 03/17/22  2200   PH 7.17* 7.14* 7.16* 7.17*   PCO2 42 47* 46* 43   PO2 144* 93 91 112*   HCO3 15* 16* 16* 16*   O2PER 50  50  60 50 50 50         Pertinent Imaging Studies:  Coronary angiogram:     Out of hospital refractory VF arrest s/p peripheral VA ECMO cannulation.    Coronary angiogram revealed patent mid LAD stent with 50% lesion at proximal stent edge. Non obstructive CAD in the LCX and RCA.    S/p insertion of L femoral IABP, L femoral cooling catheter and R radial arterial line.    Echo:   This is a trasnthoracic VA ECMO evaluation at 3.2LPM flow with IABP support at 1:1  The left ventricular ejction fraction is <10% with severe LV dilation.  ECMO cannula seen in the IVC occupying the majority of the vascular space.  Trace to mild aortic insufficiency is present. The aortic valve intermittently opens with approximatly every second to third cardiac cycle.  Global right ventricular function is severely reduced with preserved right ventricle size.  Moderate to severe mitral insufficiency is present. The cause of the mitral regurgitation is likely multifactorial there is restriction of the posterior leaflet and the size and geometry of the left ventricle are also increased/distorted. The mitral regurgitant jet is eccentrical and posteriorly directed.  Echo lucent structure in or overlaying the liver, differential include fluid filled hepatic cyst, vs overlyaing bowel, consider additional imaging if clinicall idicated.  No pericardial effusion is present.

## 2022-03-18 NOTE — PLAN OF CARE
Major Shift Events:  pt's pressor requirement increased throughout shift, lactic trending up to 20 and pt becoming more acidotic. Pt had bradycardic episodes with smaller amplitude on ecg rhythm, with these, pt began to lose flows on ecmo circuit and some pulsatility, order from [facility] to give albumin and 0.5 mg epi x2. Eventually the decision was made to move patient to comfort cares around 1500, family preference to withdraw care at 1642. [facility] contacted and following case. Time of death @ 1642, body bagged and sent with security to [facility] [place].  For vital signs and complete assessments, please see documentation flowsheets.

## 2022-03-18 NOTE — PHARMACY-VANCOMYCIN DOSING SERVICE
Pharmacy Empiric Dose Change Per Policy  Original Dose Ordered: vancomycin intermittent dosing  Dose Changed To: vancomycin IV 2000mg q24h (with start of CRRT)    This dose change was based on the pharmacist's assessment of this patient's change in renal function with start of CRRT    Creatinine Clearance=     Estimated Creatinine Clearance: 43.1 mL/min (A) (based on SCr of 2.16 mg/dL (H)).  Will continue to follow and modify dosage according to levels, organ function and clinical condition

## 2022-03-18 NOTE — PLAN OF CARE
"CRRT INITIATION NOTE    Consent for CRRT Completed:  YES  Patient s Vascular Access: Catheter              Placement Confirmed: ECMO    DATA:  Procedure:  CVVHDF  Start Time:  1845  Machine#:  [id number]  Filter:  M150  Blood Flow:  180  ML/min  Pre-Replacement Solution: PrismaSol BGK 2/3.5  Post-Replacement Solution:  PrismaSol BGK 2/3.5  Dialysate Solution: Sodium Bicarbonate in water 150 mEq/L  Pre-Replacement Solution Rate:  1500 mL/hr  Post-Replacement Solution Rate:  200 mL/hr  Dialysate Flow Rate:  1500 mL/hr   Patient Removal Rate:  I=O mL/hr  Anticoagulation Type and Rate:  Through ECMO circuit    ASSESSMENT:  How Patient Tolerated Initiation:   Vital Signs:  Vital signs:  Temp: (!) 95.2  F (35.1  C) Temp src: Esophageal   Pulse: 52   Resp: 18 SpO2: 99 % O2 Device: Mechanical Ventilator   Height: 177.8 cm (5' 10\") Weight: 123.3 kg (271 lb 13.2 oz)  Estimated body mass index is 39 kg/m  as calculated from the following:    Height as of this encounter: 1.778 m (5' 10\").    Weight as of this encounter: 123.3 kg (271 lb 13.2 oz).    Initial Pressures:  Access:  116  Filter: 11  Return:  -17  TMP:  67  Change in Filter Pressure:  -3      INTERVENTIONS:  CRRT initiated      PLAN:  Continue per POC, please call CRRT RN with any questions or concerns. [id number]                 "

## 2022-03-18 NOTE — DISCHARGE SUMMARY
[address] [address]  p: [phone]    Discharge Summary: Cardiology Service    Devonte Wisdom MRN# [id number]   YOB: 1955 Age: 67 year old       Admission Date: 3/16/2022  Discharge Date: 03/18/22      Discharge Diagnoses:  Cardiac arrest  Shock  Acute respiratory failure  Acute Kidney injury requiring dialysis  Acute Liver Failure  Lactic acidosis  Diabetes Type II  Anoxic Brain Injury    Pertinent Procedures:  Coronary angiogram  CT scan's   VA ECMO cannulation  Xrays  Line placement  Ultrasounds    Consults:  Palliative care  Neurocritical care     Imaging with results:  Echocardiogram: The left ventricular ejction fraction is <10% with severe LV dilation, Trace to mild AI, severely reduced RV    Coronary Angiogram    Out of hospital refractory VF arrest s/p peripheral VA ECMO cannulation.    Coronary angiogram revealed patent mid LAD stent with 50% lesion at proximal stent edge. Non obstructive CAD in the LCX and RCA.    S/p insertion of L femoral IABP, L femoral cooling catheter and R radial arterial line.      Brief HPI:  Devonte Wisdom is a 67 year old male who was admitted on 3/16/2022. Patient with PMH of T2DM, HLD, and prior anterior STEMI 6/6/21 s/p LISA to mLAD c/b HFrEF 2/2 ICM. Since that time, has been followed by [facility]. Last was seen on 11/17/21 at which time was noted to be doing well in rehab though had some intermittent issues with volume overload. Was maintained on Eplerenone, metop succinate, entresto and low dose diuretic with plan to see EP for consideration of CRT-D/P.     On day of admission (3/17), had cardiac arrest at home. Family found him slumped in chair and performed compressions after calling EMS. On arrival by EMS found patient initial rhtyhm to be VF, shocked 6 times. Amio, Epi and Bicarb given. Patient was taken to the CCL where he underwent coronary angiogram and placed on VA  ECMO 17Fr/25Fr. Angiogram revealed patent mid LAD stent with 50% lesion at proximal stent edge and non obstructive CAD in the LCX and RCA, no coronary interventions performed. IABP and cooling catheter were placed and was transferred to ICU for further care. LVEF < 10%, severely dilated LV.  Mod-severe MR with restriction of the posterior  leaflet . Trace/mild AI. Unfortunately, patients acute liver injury worsened exponentially over the course of the day with worsening lactic acidosis. No clear etiology for worsening lactate rise and thus no interventions could be provided. Ruled out obvious evidence of clot, infection, bleeding. Discussed medical futility with family at which time decision was made to transition patient to comfort care. Patient passed awat at 1642 on March 18th, 2021 surrounded by family. Autopsy was declined.     Condition on discharge  Temp:  [94.6  F (34.8  C)-99.5  F (37.5  C)] 99.5  F (37.5  C)  Pulse:  [0-97] 0  Resp:  [0-25] 18  MAP:  [18 mmHg-87 mmHg] 18 mmHg  Arterial Line BP: ()/(15-60) 21/15  FiO2 (%):  [50 %] 50 %  SpO2:  [91 %-100 %] 100 %  See death exam    Medication Changes:  NA    Discharge medications: NA    Labs or imaging requiring follow-up after discharge:  NA    Follow-up:  NA    Code status:  DNR/DNI    AMBAR Luis, CNP  [facility]  Cardiology-ICU Service    No care team member to display

## 2022-03-18 NOTE — PROGRESS NOTES
"[facility] [place]  NeuroCritical Care Progress Note    Patient Name:  Devonte Wisdom  MRN:  [id number]    :  1955  Date of Service:  2022  Primary care provider:  No primary care provider on file.    A/P:   67 year old man with h/o T2DM, HLD, and prior anterior STEMI s/p LISA to mLAD c/b HFrEF 2/2 ICM who presents with cardiac arrest. Now s/p VA ECMO and is being cooled to 34 degrees. Patient had an unwitnessed cardiac arrest at home. Family found patient slumped in chair and began progressions after calling EMS. Upon arrival by EMS patient was defibrillated for VF, was later noted to be in PEA, then back to Vfib. ROSC was not achieved, cannulated for VA ECMO.     24 hour events:  Patient rewarmed overnight. CT head done today, no acute pathology.  EEG flat, no seizures seen. No sedation currently. Will continue to monitor exam.     NEURO RECS  - Continue vEEG while on sedation  - Avoid hypotonic solutions as they may worsen cerebral edema  - Avoid \"nitroprusside\",\"nitroglycerin\" as they may also worsen cerbral edema.  - Advise slow correction of any high sodiums if needed.  - When safe to do so, limitation of CNS acting medications will permit a more accurate neurological assessment.  - We will continue to follow and monitor their EEG and neurologic exam, please call #[phone] with any questions.      Physical Examination:   Pulse 83   Temp 99.3  F (37.4  C)   Resp 18   Ht 1.778 m (5' 10\")   Wt 124 kg (273 lb 5.9 oz)   SpO2 100%   BMI 39.22 kg/m    General: Adult male patient, lying in bed, ill-appearing  HEENT: Normocephalic/Atraumatic, no icterus, Oral cavity/Oropharynx pink and moist  Cardiac: RRR, no JVD  Pulm: mechanically ventilated, pattern regular, expansion symmetric, no retractions or use of accessory muscles, CTAB  Abdomen: Soft, bowel sounds present, odom present  Extremities: Warm, edema present  Skin: No rash or lesion   Neuro: no sedation present, " not following commands, fixed pupils, doesn't withdraw to stimuli in 4 extremities.    I have personally reviewed all labs and imaging for this patient.    Patient discussed with attending neurologist, AMBAR Alegria, CNP  Neurocritical Care  Ascom: *[phone]

## 2022-03-18 NOTE — PROGRESS NOTES
Brief Note:    SW called pt's spouse & LVM with the intention of completing CMA.     will continue to follow for discharge planning, psychosocial support, resources, and to advocate on behalf of patient.    NOMI Finley, [facility]  [address]   [email or web address]  Office: [phone]   Pager: [phone]

## 2022-03-18 NOTE — PLAN OF CARE
Goal Outcome Evaluation:    Changes this shift: Lactic acid continues to rise overnight. Pressor needs also increasing, currently on 0.2 of levo and 3 vaso. 1 amp bicarb given. Continues to be acidotic. CRRT with goal I=O, goal met d/t high OG output. IABP 1:1 100%. Bundle changes noted overnight. Total of 750 albumin given for chugging. VA-ECMO, 60%, sweep 5, 4.3 LPM, 3800. Oozing from both ECMO cannulas and IABP/theromoguard. Rewarmed to 37 at 0200. Frequent low BG overnight, 3 amp D50 given and D10 gtt started.     Plan: Head/chest CT this AM. Continue to monitor and notify MD with pertinent changes.

## 2022-03-18 NOTE — PROGRESS NOTES
Per family and doctor request patient extubated 1642PM end of life. Family at bedside monitors turned off.

## 2022-03-18 NOTE — PROGRESS NOTES
ECLS Discontinuation Note:    ECLS was discontinued 3/18/2022 at 1642 for end of life.    June Del Cid, RT  ECMO Specialist  3/18/2022 4:52 PM

## 2022-03-18 NOTE — PROGRESS NOTES
[facility]  Palliative Care Social Work Note:    Patient Info:  Devonte Wisdom is a 67 year old male with h/o T2DM, HLD, and prior anterior STEMI s/p LISA to mLAD c/b HFrEF 2/2 ICM who presents with cardiac arrest. Now s/p VA ECMO and is being cooled to 34 degrees. Patient had an unwitnessed cardiac arrest at home. Family found patient slumped in chair and began progressions after calling EMS. Upon arrival by EMS patient was defibrillated for VF, was later noted to be in PEA, then back to Vfib. ROSC was not achieved, cannulated for VA ECMO.     Brief summary of visit: Palliative consult triggered by ECMO; team called today for support to family as pt is expected to pass within 24 hours. Visit, medical update and memory making completed today       Date of Admission: 3/16/2022    Reason for consult: Patient and family support    Sources of information: Family member wife Gayle, dtrs Macrina and Meryl    Recommendations & Plan:  Emotional support, grief resources and memory making as family prepare for pt's death     These recommendations have been discussed with team.    Symptoms & Concerns Addressed Today:  End of life emotional support and memory making    Strengths Identified:    Strong family support observed within family    Relationships & Support:  Aspects of relationships and support assessed today:  Identified family members: wife Gayle, daughters Macrina and Meryl, brother Joss  Professional supports: NA  Family coping: Actively grieving  Bereavement Risk concerns: Moderate; [employer] information provided    Coping, Mental Health & Adjustment to Illness:   Anxiety    Goals, Decision Making & Advance Care Planning:   Prognosis, Goals, and/or Advance Care Planning were assessed today: Yes  If yes, brief summary of discussion: Family notified by cardiology that patient will not make a recovery  Preferred language: English  Patient's decision making preferences: unable to assess  I have  concerns about the patient/family's health literacy today: No  Patient has a completed Health Care Directive: No.   Code status per chart review: Full Code    Clinical Social Work Interventions:   Introduction of Palliative clinical social work interventions  Attended/participated in care conference  Facilitation of processing of thoughts/feelings  Grief counseling  Life legacy work      Gertrude Marino [facility]  Palliative Care   Pager [phone]    [facility] Inpatient Team Consult pager [phone] (M-F 8-4:30)  After-hours Answering Service [phone]

## 2022-03-18 NOTE — DEATH PRONOUNCEMENT
DEATH PRONOUNCEMENT    Called to pronounce Devonte Wisdom dead.    Physical Exam: Unresponsive to noxious stimuli, Spontaneous respirations absent, Breath sounds absent, Carotid pulse absent, Heart sounds absent, Pupillary light reflex absent, and Corneal blink reflex absent    Patient was pronounced dead at 1642, 2022.    Preliminary cause of death : Cardiac arrest    Active Problems:    Cardiac arrest (H)     Infectious disease present?: No    Communicable disease present? No    Multi-drug resistant organism present? No    Please consider an autopsy if any of the following exist:  No Unexpected or unexplained death during or following any dental, medical, or surgical diagnostic treatment procedures.   No Death of mother at or up to seven days after delivery.     No All  and pediatric deaths.     No Death where the cause is sufficiently obscure to delay completion of the death certificate.   No Deaths in which autopsy would confirm a suspected illness/condition that would affect surviving family members or recipients of transplanted organs.     The following deaths must be reported to the 's Office:  No A death that may be due entirely or in part to any factors other than natural disease (recent surgery, recent trauma, suspected abuse/neglect).   No A death that may be an accident, suicide, or homicide.     No Any sudden, unexpected death in which there is no prior history of significant heart disease or any other condition associated with sudden death.   No} A death under suspicious, unusual, or unexpected circumstances.    No Any death which is apparently due to natural causes but in which the  does not have a personal physician familiar with the patient s medical history, social, or environmental situation or the circumstances of the terminal event.   No Any death apparently due to Sudden Infant Death Syndrome.     No Deaths that occur during, in association with, or as  consequences of a diagnostic, therapeutic, or anesthetic procedure.   No Any death in which a fracture of a major bone has occurred within the past (6) six months.   No A death of persons note seen by their physician within 120 days of demise.     No Any death in which the  was an inmate of a public institution or was in the custody of Law Enforcement personnel.   No  All unexpected deaths of children   No Solid organ donors   No Unidentified bodies   No Deaths of persons whose bodies are to be cremated or otherwise disposed of so that the bodies will later be unavailable for examination;   No Deaths unattended by a physician outside of a licensed healthcare facility or licensed residential hospice program   No Deaths occurring within 24 hours of arrival to a health care facility if death is unexpected.    No Deaths associated with the decedent s employment.   No Deaths attributed to acts of terrorism.   No Any death in which there is uncertainty as to whether it is a medical examiner s care should be discussed with the medical investigator.        Body disposition: Autopsy discussed and declined by family

## 2022-03-18 NOTE — PROGRESS NOTES
Nephrology Progress Note  03/18/2022         Assessment & Recommendations:   Devonte Wisdom is a 67 year old year old male with normal renal function at baseline who was admitted after out of hospital cardiac arrest. Subsequently placed on peripheral VA ECMO and required multiple vasopressors and IABP support. On admission noted to have shock liver and developed oliguric JOSE, requiring CRRT initiation on 3/17/22.    # Oliguric JOSE  # Cardiac arrest s/p peripheral VA ECMO  # Severe Anion gap metabolic acidosis  # Shock liver     - Baseline serum creatinine ~ 1.2, admitted withs serum creatinine of 1.4, subsequently has worsened to 2.16. Declining urine output, 11 ml since midnight.   - Urinalysis - specific gravity 1.011, WBC - 18, RBC - 9, presence of hyaline cast  - Etiology - likely ischemic ATN in setting of cardiogenic shock.   - He is severely acidotic with pH of 7.07 and bicarb 13.   - Continue CRRT while on VA ECMO, IABP, and vasopressors.   - 2 K solution, Bicarb post-filter (150 mEq in 1 L, increase rate to 400 ml/hr) UF goal - I=O  - Consent obtained from patient's spouse Adama on 3/17/2022  - Continue strict intake/output monitoring  - Daily weight  - Avoid hypotension and nephrotoxic medications.     Recommendations were communicated to primary team via note     Seen and discussed with Dr. Ale Gale MD   Division of Renal Disease and Hypertension  [facility]  madaimail  [employer] Web Console    Interval History :   Nursing and provider notes from last 24 hours reviewed.  In the last 24 hours Deovnte Wisdom initiated on CRRT. Noted to have hyperkalemia after rewarming, now better. Chugging on ECMO, required 750 ml of Albumin. Worsening lactic acidosis, and increased vasopressor requirements.     Review of Systems:   Unable to obtain, patient intubated and sedated    Physical Exam:   I/O last 3 completed shifts:  In: 4385.8 [I.V.:2556.8; Other:19; NG/GT:60]  Out: 4256 [Urine:128; Emesis/NG  "output:4100; Other:28]   Pulse 78   Temp 98.6  F (37  C)   Resp 18   Ht 1.778 m (5' 10\")   Wt 124 kg (273 lb 5.9 oz)   SpO2 100%   BMI 39.22 kg/m       GENERAL APPEARANCE: appears sick, intubated and sedated  EYES:  no scleral icterus, pupils equal  PULM: lungs clear to auscultation bilaterally, mechanical ventilation sounds heard  CV: regular rhythm, normal rate, no rub     -JVD unable to assess     -edema present   GI: soft, non-tender, not distended  INTEGUMENT: no cyanosis, no rash  NEURO:  Unable to assess     Labs:   All labs reviewed by me  Electrolytes/Renal - Recent Labs   Lab Test 03/18/22  1214 03/18/22  1201 03/18/22  1159 03/18/22  1152 03/18/22  1010 03/18/22  0955 03/18/22  0445 03/18/22  0400 03/17/22  2300 03/17/22  2200 03/17/22  2111 03/17/22  2037 03/17/22  0404 03/17/22  0403   NA  --   --   --  141  --  139  --  141  --  144  --  144   < > 143   POTASSIUM  --   --   --  5.0  --  5.7*  --  4.9  --  7.3*  4.6  --  5.0   < > 4.8   CHLORIDE  --   --   --  105  --  104  --  108  --  112*  --  113*   < > 116*   CO2  --   --   --   --   --  13*  --  16*  --  16*  --  15*   < > 19*   BUN  --   --   --   --   --  36*  --  38*  --  43*  --  46*   < > 32*   CR  --   --   --   --   --  2.53*  --  2.09*  --  2.27*  --  2.44*   < > 1.59*   * 18* 17*  --    < > 63*  67*   < > 58*  59*   < > 105*  98  99   < > 82   < > 134*  136*   DERRELL  --   --   --   --   --  8.2*  --  8.2*  --  8.8  --  8.0*   < > 7.1*   MAG  --   --   --  2.0  --  2.1  --  2.2  --  2.4*  --  2.5*   < > 2.3   PHOS  --   --   --   --   --   --   --  7.1*  --   --   --  7.2*  --  4.5    < > = values in this interval not displayed.       CBC -   Recent Labs   Lab Test 03/18/22  1152 03/18/22  0956 03/18/22  0400   WBC 8.8 10.0 9.9   HGB 7.3* 8.5* 10.4*   PLT 83* 90* 124*       LFTs -   Recent Labs   Lab Test 03/18/22  0955 03/18/22  0400 03/17/22  2200   ALKPHOS 136 93 71   BILITOTAL 1.9* 1.6* 1.6*   ALT 6,201* 2,912* 952* "   AST 9,096* 3,927* 1,579*   PROTTOTAL 4.7* 5.4* 5.0*   ALBUMIN 2.9* 3.3* 3.2*       Iron Panel -   Recent Labs   Lab Test 03/18/22  0400   KAT 11,259*         Imaging:  All imaging studies reviewed by me.     Current Medications:    artificial tears   Both Eyes Q8H     aspirin  81 mg Oral Daily     clopidogrel  75 mg Oral Daily     pantoprazole (PROTONIX) IV  40 mg Intravenous Daily     piperacillin-tazobactam  4.5 g Intravenous Q6H     vancomycin  2,000 mg (central catheter) Intravenous Q24H       albumin human       dextrose 100 mL/hr at 03/18/22 1200     CRRT replacement solution 12.5 mL/kg/hr (03/18/22 1009)     fentaNYL Stopped (03/18/22 0730)     heparin (PRESSURE BAG) 2 unit/mL in 0.9% NaCl 6 Units/hr (03/18/22 1200)     HEParin Stopped (03/18/22 0618)     insulin regular       midazolam Stopped (03/17/22 1300)     - MEDICATION INSTRUCTIONS -       norepinephrine 0.5 mcg/kg/min (03/18/22 1200)     POST-filter replacement solution for CVVHD & CVVHDF (Sodium Bicarbonate in water 150 mEq/L for infusion) 200 mL/hr at 03/18/22 1008     CRRT replacement solution 12.5 mL/kg/hr (03/18/22 1009)     vasopressin 4 Units/hr (03/18/22 1200)     Topher Glae MD

## 2022-03-18 NOTE — PROGRESS NOTES
ECMO Shift Summary:  7pm-7am      ECMO Equipment:  Console serial number: [id number]  Circuit Lot number: [id number]  Oxygenator Lot number: [id number]      Patient remains on VA ECMO, all equipment is functioning and alarms are appropriately set. RPM's 3800 with flow range 4.28-4.35 L/min. Sweep gas is at 7 LPM and FiO2 100%. Circuit remains free of air, clot and fibrin.  Cannulas are secure with moderate bleeding from site. Extremities are warm.      Significant Shift Events: Intermittent circuit chug throughout shift.  Chug resolves with volume.  Was on heparin for a period of time but ACTs were above goal so turned heparin back off at 0615.      Vent settings:  Vent Mode: CMV/AC  (Continuous Mandatory Ventilation/ Assist Control)  FiO2 (%): 50 %  Resp Rate (Set): 18 breaths/min  Tidal Volume (Set, mL): 400 mL  PEEP (cm H2O): 5 cmH2O  Resp: 19  .    No anticoagulation running at this time, ACT range 177-211.    Urine output is per CRRT, blood loss was moderate. Product given included 500 albumin.       Intake/Output Summary (Last 24 hours) at 3/18/2022 0623  Last data filed at 3/18/2022 0600  Gross per 24 hour   Intake 4190.8 ml   Output 4256 ml   Net -65.2 ml       ECHO:  No results found for this or any previous visit.  No results found for this or any previous visit.      CXR:  Recent Results (from the past 24 hour(s))   Echocardiogram Complete    Narrative    [id number]  [id number]  [id number]  [id number]^LISETH^ANASTACIO^QUINCY     [facility]  Echocardiography Laboratory  [address] [place] [id number]     Name: JAMAAL BRANNON  MRN: [id number]  : 1955  Study Date: 2022 09:08 AM  Age: 67 yrs  Gender: Male  Patient [facility]: [facility]  Reason For Study: Heart Failure  Ordering Physician: ANASTACIO CHILDERS  Performed By: [facility] Amina Baker     BSA: 2.4 m2  Height: 70 in  Weight: 271 lb  BP: 78/44  mmHg  ______________________________________________________________________________  Procedure  Complete Portable Echo Adult.  ______________________________________________________________________________  Interpretation Summary  This is a trasnthoracic VA ECMO evaluation at 3.2LPM flow with IABP support at  1:1     The left ventricular ejction fraction is <10% with severe LV dilation.     ECMO cannula seen in the IVC occupying the majority of the vascular space.     Trace to mild aortic insufficiency is present. The aortic valve intermittently  opens with approximatly every second to third cardiac cycle.     Global right ventricular function is severely reduced with preserved right  ventricle size.     Moderate to severe mitral insufficiency is present. The cause of the mitral  regurgitation is likely multifactorial there is restriction of the posterior  leaflet and the size and geometry of the left ventricle are also  increased/distorted. The mitral regurgitant jet is eccentrical and posteriorly  directed.     Echo lucent structure in or overlaying the liver, differential include fluid  filled hepatic cyst, vs overlyaing bowel, consider additional imaging if  clinicall idicated.     No pericardial effusion is present.     There is no prior study for direct comparison.     Findings were called to the primary team, Tisha Young at 955am on 3/17/2022.  ______________________________________________________________________________  Left Ventricle  The left ventricular ejction fraction is <10% with severe LV dilation.     Right Ventricle  The right ventricle is normal size. Global right ventricular function is  severely reduced.     Atria  The atria cannot be assessed.     Mitral Valve  Moderate to severe mitral insufficiency is present. The cause of the mitral  regurgitation is likely multifactorial there is restriction of the posterior  leaflet and the size and geometry of the left ventricle are  also  increased/distorted. The mitral regurgitant jet is eccentrical and posteriorly  directed.     Aortic Valve  The aortic valve intermittently opens with approximatly every second to third  cardiac cycle. Trace to mild aortic insufficiency is present.     Tricuspid Valve  The tricuspid valve cannot be assessed. The peak velocity of the tricuspid  regurgitant jet is not obtainable. Pulmonary artery systolic pressure cannot  be assessed.     Pulmonic Valve  The valve leaflets are not well visualized.     Vessels  Unable to assess mean RA pressure given the patient is on a ventilator.     Pericardium  No pericardial effusion is present.     Miscellaneous  This is a trasnthoracic VA ECMO evaluation at 3.2LPM flow with IABP support at  1:1     Echo lucent structure in or overlaying the liver, differential include fluid  filled hepatic cyst, vs overlyaing bowel, consider additional imaging if  clinicall idicated.     Compared to Previous Study  There is no prior study for direct comparison.  ______________________________________________________________________________  MMode/2D Measurements & Calculations  IVSd: 0.94 cm  LVIDd: 7.3 cm  LVIDs: 7.2 cm  LVPWd: 1.0 cm  FS: 1.6 %  LV mass(C)d: 333.8 grams  LV mass(C)dI: 140.6 grams/m2  RWT: 0.27     ______________________________________________________________________________  Report approved by: Pop White 03/17/2022 10:00 AM         US Carotid Bilateral    Narrative    Exam: Bilateral carotid duplex Doppler ultrasound dated 82    Clinical history: Cardiac Arrest on ECMO    Comparison Study: None    Ordering provider: Mark    Technique: Grayscale (B-mode) and duplex and spectral Doppler  ultrasound of the extracranial internal carotid, external carotid,  vertebral artery origins, right brachiocephalic/subclavian and left  subclavian arteries. Velocity measurements obtained with angle  correction at or less than 60 degrees.    Findings:    RIGHT SIDE:      Plaque Morphology: Predominantly echogenic, Smooth       Proximal CCA: 114/40 cm/sec     Mid CCA: 82/19 cm/sec     Distal CCA: 82/18 cm/sec     External CA: 72 cm/sec       Proximal ICA: 87/27 cm/sec     Mid ICA: 108/26 cm/sec     Distal ICA: 92/24 cm/sec       Vertebral artery: 84 cm/sec       ICA/CCA ratio: 1.33    LEFT SIDE:     Plaque Morphology: Predominantly echogenic, Smooth       Proximal CCA: 127/41 cm/sec     Mid CCA: 112/44 cm/sec     Distal CCA: 92/56 cm/sec     External CA: 73/10 cm/sec       Proximal ICA: 70/21 cm/sec     Mid ICA: 100/66 cm/sec     Distal ICA: 124/82 cm/sec       Vertebral artery: 89/58 cm/sec        ICA/CCA ratio: 1.35      Impression    IMPRESSION:    1. Right side:        Degree of stenosis of the internal carotid artery: Less than 50 %.    2. Left side:         Degree of stenosis of the internal carotid artery: Less than 50 %.    3. Waveforms compatible with ECMO.    [facility] Accreditation Commission Updated Recommendations for  Carotid Stenosis Interpretation Criteria - October 2021.  https://intersocietal.org/wp-content/uploads/2021/10/IAC-Vascular-Test  ug-Xfhcepsumufga_Ekmmlnb-Elfsymprwxanrua-for-Carotid-Stenosis-Interpre  ation-Criteria.pdf         Normal            ICA PSV: < 180 cm/s            Plaque Estimate: None            ICA/CCA PSV Ratio: < 2.0            ICA EDV: < 40 cm/s         < 50%            ICA PSV: < 180 cm/s            Plaque Estimate: < 50%            ICA/CCA PSV Ratio: < 2.0            ICA EDV: < 40 cm/s         50 - 69%            ICA PSV: < 180 - 230 cm/s            Plaque Estimate: > 50%            ICA/CCA PSV Ratio: 2.0 - 4.0            ICA EDV: 40 - 100 cm/s         > 70% but less than near occlusion            ICA PSV: > 230 cm/s            Plaque Estimate: > 50%            ICA/CCA PSV Ratio: > 4.0            ICA EDV: > 100 cm/s         Near occlusion            ICA PSV: > 230 cm/s            Plaque Estimate: Visible            ICA/CCA PSV  Ratio: Variable            ICA EDV: Variable         Total occlusion            ICA PSV: Undetectable            Plaque Estimate: Visible, no detectable lumen            ICA/CCA PSV Ratio: N/A            ICA EDV: N/A    I have personally reviewed the examination and initial interpretation  and I agree with the findings.    GEE HERNÁNDEZ MD         SYSTEM ID:  [id number]   US Lower Extremity Arterial Duplex Bilateral    Narrative    Exam: Duplex ultrasound of bilateral lower extremity arteries dated  3/17/2022 11:04 AM     Clinical information: Baseline to assess blood flow to Lower  Extremities (VA ECMO)     Comparison: CT abdomen 3/16/2022    Technique: Grayscale (B-mode), color Doppler, and duplex spectral  Doppler ultrasound of the lower extremity arteries. Velocity  measurements obtained with angle correction of 60 degrees or less.    Ordering provider: Mark    Findings:     Right lower extremity:     Common femoral artery: Nonvisualized secondary to ECMO.  Profunda femoral artery: Nonvisualized secondary to ECMO.  Proximal SFA: Not visualized secondary to ECMO  Mid SFA:Velocity:  35 cm/sec. Waveforms: Consistent with ECMO  Distal SFA: Velocity: 21 cm/sec. Waveforms: Consistent with ECMO    Popliteal artery: Velocity: 15 cm/sec. Waveforms: Consistent with ECMO    PTA ankle: Velocity: 28 cm/sec. Waveforms: Consistent with ECMO  HUI ankle: Velocity: 16 cm/sec. Waveforms: Consistent with ECMO    Left lower extremity:    Common femoral artery: Nonvisualized secondary to line placement.  Deep femoral artery: Velocity: Not visualized secondary to line  placement  Proximal SFA: Velocity: 106 cm/sec. Waveforms: Triphasic  Mid SFA: Velocity: 74 cm/sec. Waveforms: Triphasic  Distal SFA: Velocity: 50 cm/sec. Waveforms: Triphasic    Popliteal artery: Velocity: 39 cm/sec. Waveforms: Biphasic    PTA ankle: Velocity: 52 cm/sec. Waveforms: Biphasic  HUI ankle: Velocity: 45 cm/sec. Waveforms: Biphasic      Impression     Impression:     1. Right leg: Dopplerable flow in the right leg.  Waveforms consistent  with ECMO cannulation.    2. Left leg: Dopplerable flow in the left leg..  Predominantly  triphasic and biphasic waveforms.        Guidelines:    [facility] duplex criteria for lower limb arterial  occlusive disease    Percent stenosis:     Normal (1-19%): Peak systolic velocity (cm/s): <150, End-diastolic  velocity (cm/s): <40, Velocity ratio (Vr): <1.5, Distal arterial  waveform: Triphasic    20-49%: Peak systolic velocity (cm/s): 150-200, End-diastolic velocity  (cm/s): <40, Velocity ratio (Vr): 1.5-2.0, Distal arterial waveform:  Triphasic    50-75%: Peak systolic velocity (cm/s): 200-300, End-diastolic velocity  (cm/s): <90, Velocity ratio (Vr): 2.0-3.9, Distal arterial waveform:  Poststenotic turbulence distal to stenosis, monophasic distal waveform    >75%: Peak systolic velocity (cm/s): >300, End-diastolic velocity  (cm/s): <90, Velocity ratio (Vr): >4.0, Distal arterial waveform:  Dampened distal waveform and low PSV/EDV* in the stenosis    Occlusion: Absent flow by color Doppler/pulsed Doppler spectral  analysis; length of occlusion estimated from distance between exit and  reentry collateral arteries    *PSV = peak systolic velocity, EDV = end-diastolic velocity  http://link.alas.com/chapter/10.1007/[email or web address]_23/fulltext  html    I have personally reviewed the examination and initial interpretation  and I agree with the findings.    GEE HERNÁNDEZ MD         SYSTEM ID:  [id number]   XR Chest Port 1 View    Impression    RESIDENT PRELIMINARY INTERPRETATION  IMPRESSION:   1. Increased perihilar and bibasilar opacities representing infection,  aspiration, or atelectasis.  2. Stable support devices.       Labs:  Recent Labs   Lab 03/18/22  0600 03/18/22  0400 03/18/22  0200 03/17/22  2355   PH 7.15* 7.17* 7.14* 7.16*   PCO2 43 42 47* 46*   PO2 143* 144* 93 91   HCO3 15* 15* 16* 16*   O2PER 50 50   50  60 50 50       Lab Results   Component Value Date    HGB 10.4 (L) 03/18/2022    PHGB 30 (H) 03/18/2022     (L) 03/18/2022    FIBR 323 03/18/2022    INR 1.80 (H) 03/18/2022    PTT 79 (H) 03/18/2022    DD 3.78 (H) 03/18/2022    ANTCH 81 (L) 03/17/2022         Plan is obtain CT scan this morning.  Will remain on VA ECMO support.      Gena Stallings, RRT-NPS  ECMO Specialist  3/18/2022 6:23 AM

## 2022-03-18 NOTE — CONSULTS
[facility]  Palliative Care Consultation Note    Patient: Devonte Wisdom  Date of Admission:  3/16/2022    Requesting Clinician / Team: ICU  Reason for consult: Decisional support    Recommendations:    Co-visit with primary team and family shared information that despite maximal care, patient is dying. Family tearful and quite sad but accepting and understanding. Explained that we would likely stop ECMO after family has had a chance to say goodbye to the patient. Family requested a few additional visitors to come visit. Two young daughter expressed a desire NOT to be present with ECMO discontinued. Wife does not want the patient to be alone when he passes. Discussed that we could not perform CPR if the patient were to die prior to discontinuation of ECMO.      DNR, no shocks. Ok for prn epi doses if needed to try to help family have time to visit      Recommendations for discontinuing life-sustaining treatment:     Stop tube feeds ideally 4 hours prior      Pre-medicate with 0.2mg IV glycopyrrolate    Coordinate timing of procedure with family, respiratory therapy, and nursing.    Prior to ventilator withdrawal, pre-medicate with versed and fentanyl -- given he wont have circulation of meds once ECMO is stopped anticipate needing to use aggressive pre-medication. Therefore adjusted doses to allow fentanyl 100-200 mcg range and versed 2-4 mg range    After 5 minutes, titrate vent setting to FiO2 21% and a PEEP of 5    Assess for respiratory distress. If patient appears comfortable, proceed with withdrawal of ventilator and discontinuation of ECMO.  After ECMO discontinuation and extubation anticipate patient will die quickly, however if patient develops respiratory distress bolus additional IV fentanyl.  If respiratory distress / air hunger persists, double bolus dose every 5 minutes until patient is not in respiratory distress and is comfortable.         Palliative SW  provided additional support to family     These recommendations have communicate via the medical chart.       Thank you for the opportunity to participate in the care of this patient and family. Our team: will continue to follow.     During regular M-F work hours -- if you are not sure who specifically to contact -- please contact us by sending a text page to our team consult pager at [phone].    After regular work hours and on weekends/holidays, you can call our answering service at [phone]. Also, who's on call for us is available in [employer].     Douglas Trevizo MD   Hospice and Palliative Medicine Fellow  [facility] Inpatient Team Consult Pager [phone] (answered 8am-430pm M-F) - ok to text page via tok tok tok / After-Hours Answering Service [phone] / Palliative Clinic in the [facility] [place] [phone] (scheduling); [phone] (triage).        Dianelys Sen MD / Palliative Medicine / Text me via [employer].         Assessments:  Devonte Wisdom is a 67 year old male with ahistory of ischemic cardiomyopathy and presented after a cardiac arrest at home. He was found to be in VF and receiving multiple shocks eventually requiring VA ECMO. Extensive diagnostic work-up unrevealing for reversible cause and labs show multiorgan failure with worsening hepatic ischemia with rising LFTs and elevated lactate despite CRRT. He continues to have intermittent unstable arrhthymias requiring multiple pressors.  Unfortunately, despite heroic efforts by family who performed CPR at home and aggressive interventions here in the hospital, it appears that Mr. Wisdom is dying.     Today, the patient was seen for:  Decisional support     Prognosis, Goals, & Planning:      Functional Status just prior to hospitalization: 0 (Fully active, able to carry on all activities without restriction)      Prognosis, Goals, and/or Advance Care Planning were addressed today: Yes        Summary/Comments:        Patient's decision making preferences: unable to assess          Patient has decision-making capacity today for complex decisions: No            I have concerns about the patient/family's health literacy today: No           Patient has a completed Health Care Directive: No.       Code status: No CPR; pre-arrest intubation OK    Coping, Meaning, & Spirituality:   Mood, coping, and/or meaning in the context of serious illness were addressed today: No  Summary/Comments:     Social:     Living situation: Lives with wife and children    Key family / caregivers: wife     History of Present Illness:  History gathered today from: family/loved ones, medical chart, medical team members    Out of hospital arrest requiring VA ECMO and IABP. Labs notable for renal failure, hepatic ischemia, lactic acidosis. TTE with severe LV dysfunction. Now on multiple pressors, broad-spectrum abx. Abdominal imaging shows no reversible cause for liver ischemia or lactic acidosis. Brain imaging shows no acute pathology at this time.     Discussion with family included wife, two daughters and brother. Family had been prepared to hear that the patient would likely not survive. They were extremely sad and tearful about hearing the news that his condition is not survivable. They would like to have a few more family members come in to say goodbye. Family was actively grieving at the end of the care conference.     Key Palliative Symptom Data:  We are not managing pain in this patient  We are not managing dyspnea in this patient  We are not managing nausea in this patient  We are not managing anxiety in this patient    Patient is on opioids: bowels not assessed today.    ROS:  Comprehensive ROS is reviewed and is negative except as here & per HPI:      Past Medical History:  Past Medical History:   Diagnosis Date     Asthma bronchial      HTN (hypertension)      Hypertriglyceridemia      Irritable bowel syndrome      Obesity         Past  Surgical History:  Past Surgical History:   Procedure Laterality Date     VITRECTOMY PARSPLANA WITH 23 GAUGE SYSTEM, LENSECTOMY  8/22/2012    Procedure: COMBINED VITRECTOMY PARSPLANA, LENSECTOMY;  RIGHT VITRECTOMY PARSPLANA 20 GAUGE, LENSECTOMY;  Surgeon: Chauncey Hercules MD;  Location: [facility] APPENDECTOMY           Family History:  Family History   Problem Relation Age of Onset     Depression Mother      Hypertension Father      Asthma Paternal Grandmother      Hypertension Paternal Grandfather      Asthma Brother      Allergies:  Allergies   Allergen Reactions     Amlodipine Swelling     Leg edema - not allergy  Leg edema - not allergy       Contrast Dye      PN: LW CM1: CONTRAST- nka Reaction :     Lisinopril Cough     Dapagliflozin Rash        Medications:  I have reviewed this patient's medication profile and medications from this hospitalization.   Noted scheduled meds are:  Zosyn, Epinephrine, Vasopressin    Physical Exam:  Vital Signs: Temp: 99.5  F (37.5  C) Temp src: Esophageal   Pulse: 85   Resp: 18 SpO2: 100 % O2 Device: Mechanical Ventilator    Weight: 273 lbs 5.93 oz  General: Lying in bed sedated  HEENT: scattered ecchymoses on his nose, face   Neuro: None-responsive   Resp: no distress     Data reviewed:  Recent imaging reviewed, my comments on pertinents:   CT abdomen and chest without significant findings.   Brain imaging without acute pathology     Recent lab data reviewed, my comments on pertinents:   Elevated lactate, elevated liver enzymes, elevated Cr/k, trop I 125,000

## 2022-03-18 NOTE — PROGRESS NOTES
ECMO Attending Progress Note  3/17/2022    Devonte Wisdom is a 67 year old male who was cannulated for ECMO 3/17/2022 due to refractory VF arrest    Cannulation Site:  17 Fr in the R femoral artery  25 Fr in the R femoral vein    Interval events: cooled, with planned rewarming tonight, stable on low dose pressors    Pulsatilty (IABP paused if applicable):0 mmHG     Physical Exam:  Temp:  [92.7  F (33.7  C)-96.3  F (35.7  C)] 96.3  F (35.7  C)  Pulse:  [36-74] 54  Resp:  [0-24] 18  MAP:  [60 mmHg-78 mmHg] 73 mmHg  Arterial Line BP: ()/(41-56) 106/49  FiO2 (%):  [50 %] 50 %  SpO2:  [55 %-100 %] 100 %    Gross per 24 hour   Intake 7700.88 ml   Output 5362 ml   Net 2338.88 ml    Vent Mode: CMV/AC  (Continuous Mandatory Ventilation/ Assist Control)  FiO2 (%): 50 %  Resp Rate (Set): 18 breaths/min  Tidal Volume (Set, mL): 400 mL  PEEP (cm H2O): 5 cmH2O  Resp: 18       Labs:  Recent Labs   Lab 22   PH 7.16* 7.17* 7.12* 7.12*   PCO2 46* 43 47* 46*   PO2 91 112* 107* 102   HCO3 16* 16* 15* 15*   O2PER 50 50 50 50      Recent Labs   Lab 22  1529 22  1412 22  0949   WBC 7.8 8.5 8.2 9.9   HGB 10.6* 11.4* 11.4* 11.4*     Creatinine   Date Value Ref Range Status   2022 2.27 (H) 0.66 - 1.25 mg/dL Final   2022 2.44 (H) 0.66 - 1.25 mg/dL Final   2022 2.16 (H) 0.66 - 1.25 mg/dL Final   2022 2.11 (H) 0.66 - 1.25 mg/dL Final   2009 1.09 0.66 - 1.25 mg/dL Final     Comment:     New IDMS-traceable calibration  beginning 2007 1.18 0.80 - 1.50 mg/dL Final     Blood Flow (Circuit) LPM: 4.32 LPM  Gas Flow  LPM: 5 LPM  Gas FiO2   %: 60 %  ACT  (seconds): 177 seconds  Blood Temp  (degrees C): 35.9 C  Pulse Oximetry  (SpO2%): 98 %  Arterial Pressure  mmH mmHg    ECMO Issues including assessments and plan on DOS 3/17/2022:  Neuro: Sedated for mechanical ventilation and ECMO.  No acute distress.  NIRS  stable b/l  RASS goal: -3  CV: Cardiogenic shock.  Hemodynamically stable on low dose norepi and vaso  Pulm: Keep vent settings at rest settings as above.  FEN/Renal: Electrolytes stable w/ replacement protocols in place, Cr stable, UOP stable  Heme: ACT goal: 180-200, Hemoglobin stable .  Minimal oozing around the ECMO cannulas.  ID: Receiving empiric antibiotics  Cannulae: Position is acceptable on exam and the available imaging.  Distal perfusion cannula is in place and patent.  Extremities are well-perfused.     I have personally reviewed the ECMO flows, oxygenation and CO2 clearance, anticoagulation, and cannula position.  I have also personally assessed the patient's systemic response with hemodynamics, oxygenation, ventilation, and bleeding.       The patient requires continued ECMO support and management in the ICU.  I have discussed patient care and treatment plan with the primary team.      Eric Gardiner MD, PhD  Interventional/Critical Care Cardiology  [phone]    March 17, 2022

## 2022-03-19 LAB
ATRIAL RATE - MUSE: 66 BPM
ATRIAL RATE - MUSE: 80 BPM
BACTERIA SPT CULT: ABNORMAL
DIASTOLIC BLOOD PRESSURE - MUSE: NORMAL MMHG
DIASTOLIC BLOOD PRESSURE - MUSE: NORMAL MMHG
GRAM STAIN RESULT: ABNORMAL
GRAM STAIN RESULT: ABNORMAL
INTERPRETATION ECG - MUSE: NORMAL
INTERPRETATION ECG - MUSE: NORMAL
P AXIS - MUSE: 37 DEGREES
P AXIS - MUSE: NORMAL DEGREES
PR INTERVAL - MUSE: 162 MS
PR INTERVAL - MUSE: 208 MS
QRS DURATION - MUSE: 152 MS
QRS DURATION - MUSE: 160 MS
QT - MUSE: 408 MS
QT - MUSE: 446 MS
QTC - MUSE: 467 MS
QTC - MUSE: 470 MS
R AXIS - MUSE: 38 DEGREES
R AXIS - MUSE: 46 DEGREES
SYSTOLIC BLOOD PRESSURE - MUSE: NORMAL MMHG
SYSTOLIC BLOOD PRESSURE - MUSE: NORMAL MMHG
T AXIS - MUSE: 215 DEGREES
T AXIS - MUSE: 233 DEGREES
VENTRICULAR RATE- MUSE: 66 BPM
VENTRICULAR RATE- MUSE: 80 BPM

## 2022-03-19 NOTE — PROGRESS NOTES
ECMO Attending Progress Note  3/18/2022    Devonte Wisdom is a 67 year old male who was cannulated for ECMO 3/17/2022 due to refractory VF arrest    Cannulation Site:  17 Fr in the R femoral artery  25 Fr in the R femoral vein    Interval events: rewarmed, acute liver failure upon rewarming, Tpn very high, severe multi-system organ failure    Pulsatilty (IABP paused if applicable):0 mmHG     Physical Exam:  Temp:  [94.6  F (34.8  C)-99.5  F (37.5  C)] 99.5  F (37.5  C)  Pulse:  [0-97] 0  Resp:  [0-25] 18  MAP:  [18 mmHg-87 mmHg] 18 mmHg  Arterial Line BP: ()/(15-60) 21/15  FiO2 (%):  [50 %] 50 %  SpO2:  [91 %-100 %] 100 %    Intake/Output Summary (Last 24 hours) at 3/18/2022 1957  Last data filed at 3/18/2022 1642  Gross per 24 hour   Intake 4915.69 ml   Output 2280 ml   Net 2635.69 ml        Vent Mode: CMV/AC  (Continuous Mandatory Ventilation/ Assist Control)  FiO2 (%): 50 %  Resp Rate (Set): 18 breaths/min  Tidal Volume (Set, mL): 400 mL  PEEP (cm H2O): 5 cmH2O  Resp: 18       Labs:  Recent Labs   Lab 22  1334 22  1151 22  0955 22  0802   PH 7.13* 7.07* 7.08* 7.14*   PCO2 29* 35 42 45   PO2 124* 154* 188* 146*   HCO3 10* 10* 12* 15*   O2PER 50 50 50 50      Recent Labs   Lab 22  1152 22  0956 22  0400 22  2200   WBC 8.8 10.0 9.9 7.8   HGB 7.3* 8.5* 10.4* 10.6*     Creatinine   Date Value Ref Range Status   2022 2.29 (H) 0.66 - 1.25 mg/dL Final   2022 2.53 (H) 0.66 - 1.25 mg/dL Final   2022 2.09 (H) 0.66 - 1.25 mg/dL Final   2022 2.27 (H) 0.66 - 1.25 mg/dL Final   2009 1.09 0.66 - 1.25 mg/dL Final     Comment:     New IDMS-traceable calibration  beginning 2007 1.18 0.80 - 1.50 mg/dL Final   Blood Flow (Circuit) LPM: 4.03 LPM  Gas Flow  LPM: 5 LPM  Gas FiO2   %: 60 %  ACT  (seconds): 237 seconds  Blood Temp  (degrees C): 36.7 C  Pulse Oximetry  (SpO2%): 100 %  Arterial Pressure  mmH mmHg    ECMO Issues including  assessments and plan on DOS 3/18/2022:  Neuro: Sedated for mechanical ventilation and ECMO.  No acute distress.  NIRS stable b/l  RASS goal: -3  CV: Cardiogenic shock.  Hemodynamically stable on moderate dose norepi and vaso  Pulm: Keep vent settings at rest settings as above.  FEN/Renal: Electrolytes stable w/ replacement protocols in place, Cr stable, UOP stable  Heme: ACT goal: 180-200, Hemoglobin stable .  Minimal oozing around the ECMO cannulas.  ID: Receiving empiric antibiotics  Cannulae: Position is acceptable on exam and the available imaging.  Distal perfusion cannula is in place and patent.  Extremities are well-perfused.     I have personally reviewed the ECMO flows, oxygenation and CO2 clearance, anticoagulation, and cannula position.  I have also personally assessed the patient's systemic response with hemodynamics, oxygenation, ventilation, and bleeding.       The patient requires continued ECMO support and management in the ICU.  I have discussed patient care and treatment plan with the primary team.      Eric Gardiner MD, PhD  Interventional/Critical Care Cardiology  [phone]    March 18, 2022

## 2022-03-20 LAB
BACTERIA SPT CULT: ABNORMAL
BACTERIA SPT CULT: ABNORMAL
GRAM STAIN RESULT: ABNORMAL
GRAM STAIN RESULT: ABNORMAL

## 2022-03-21 LAB — NSE SERPL IA-MCNC: 77.4 NG/ML

## 2022-03-22 LAB
BACTERIA BLD CULT: NO GROWTH
BACTERIA BLD CULT: NO GROWTH

## 2022-03-23 LAB
BACTERIA BLD CULT: NO GROWTH
S100 CA BINDING PROTEIN B SER-MCNC: 585 NG/L
S100 CA BINDING PROTEIN B SER-MCNC: 759 NG/L
S100 CA BINDING PROTEIN B SER-MCNC: 976 NG/L

## 2022-03-24 ENCOUNTER — TELEPHONE (OUTPATIENT)
Dept: CARDIOLOGY | Facility: CLINIC | Age: 67
End: 2022-03-24
Payer: COMMERCIAL

## 2022-03-24 NOTE — TELEPHONE ENCOUNTER
[facility] Call Center    Phone Message    May a detailed message be left on voicemail: yes     Reason for Call: Other: Per caller they need Dr. Gardiner to go online to finalized pt death report to  is tomorrow would like urgent in this.     Action Taken: Message routed to:  [facility] ([facility]): Cardiology    Travel Screening: Not Applicable

## 2022-03-25 LAB
7AMINOCLONAZEPAM SERPL-MCNC: NEGATIVE NG/ML
ALPRAZ SERPL-MCNC: NEGATIVE NG/ML
BENZODIAZ SPEC QL: POSITIVE
CHLORDIAZEP SERPL-MCNC: NEGATIVE NG/ML
CLONAZEPAM SERPL-MCNC: NEGATIVE NG/ML
DESALKYLFLURAZ SERPL CFM-MCNC: NEGATIVE NG/ML
DIAZEPAM SERPL-MCNC: NEGATIVE NG/ML
FLURAZEPAM SPEC-MCNC: NEGATIVE NG/ML
LORAZEPAM SERPL-MCNC: NEGATIVE NG/ML
MIDAZOLAM SERPL-MCNC: 159.5 NG/ML
NORCHLORDIAZEP SERPL-MCNC: NEGATIVE UG/ML
NORDIAZEPAM SPEC-MCNC: NEGATIVE NG/ML
OXAZEPAM SERPL CFM-MCNC: NEGATIVE NG/ML
TEMAZEPAM SERPL-MCNC: NEGATIVE NG/ML
TRIAZOLAM SPEC-MCNC: NEGATIVE NG/ML

## 2022-03-29 LAB
AMPHET BLD CFM-MCNC: NEGATIVE NG/ML
APAP BLD-MCNC: NEGATIVE UG/ML
BARBITURATES SPEC-MCNC: NEGATIVE UG/ML
BENZODIAZ SPEC-MCNC: ABNORMAL NG/ML
BUPRENORPHINE SERPL-MCNC: NEGATIVE NG/ML
BZE BLD CFM-MCNC: NEGATIVE NG/ML
CARBOXYTHC BLD-MCNC: NEGATIVE NG/ML
CARISOPRODOL IA: NEGATIVE UG/ML
DECLARED MEDICATIONS: ABNORMAL
DRUGS FLD: POSITIVE
ETHANOL BLD-MCNC: NEGATIVE GM/DL
FENTANYL IA: NEGATIVE NG/ML
GABAPENTIN IA: NEGATIVE UG/ML
MEPERIDINE SERPLBLD-MCNC: NEGATIVE NG/ML
METHADONE SAL CFM-MCNC: NEGATIVE NG/ML
OPIATES SPEC-MCNC: NEGATIVE NG/ML
OXYCODONE SERPLBLD SCN-MCNC: NEGATIVE NG/ML
PCP SPEC-MCNC: NEGATIVE NG/ML
PROPOXYPH SPEC-MCNC: NEGATIVE NG/ML
TRAMADOL BLD-MCNC: NEGATIVE NG/ML

## (undated) DEVICE — 8FR X 24CM SUPER ARROW-FLEX PERCUTANEOUS SHEATH INTRODUCER SET WITH INTEGRAL HEMOSTASIS VALVE/SIDE PORT AND RADIOPAQUE TIP MARKER BAND

## (undated) DEVICE — CATH QUATTRO 9.3FR  FEM INSTRN

## (undated) DEVICE — KIT ARTERIAL LINE 2GA 12CM INTRO NDL ASK-04510-HF

## (undated) DEVICE — CANNULA VENOUS 25FR LONG

## (undated) DEVICE — TUBING PRESSURE 30"

## (undated) DEVICE — PACK HEART LEFT CUSTOM

## (undated) DEVICE — CATH ANGIO SUPERTORQUE PLUS JL4 6FRX100CM 533620

## (undated) DEVICE — MANIFOLD KIT ANGIO AUTOMATED 014613

## (undated) DEVICE — INTRO SHEATH 9FRX10CM PINNACLE RSS902

## (undated) DEVICE — 17FR 23CM ARTERIAL ECMO CANNULA WITH BIOLINE COATING

## (undated) DEVICE — Device

## (undated) DEVICE — WIRE GUIDE 0.035"X145CM AMPLATZ XSTIFF J THSCF-35-145-3-AES

## (undated) DEVICE — INTRO SHEATH 6FRX25CM PINNACLE RSS606

## (undated) DEVICE — INTRO SHEATH MICRO PLATINUM TIP 4FRX40CM 7274

## (undated) DEVICE — KIT HAND CONTROL ACIST 014644 AR-P54

## (undated) DEVICE — CATH ANGIO SUPERTORQUE PLUS JR4 6FRX100CM 533621

## (undated) RX ORDER — MIDAZOLAM HCL IN 0.9 % NACL/PF 1 MG/ML
PLASTIC BAG, INJECTION (ML) INTRAVENOUS
Status: DISPENSED
Start: 2022-01-01

## (undated) RX ORDER — NOREPINEPHRINE BITARTRATE 0.06 MG/ML
INJECTION, SOLUTION INTRAVENOUS
Status: DISPENSED
Start: 2022-01-01

## (undated) RX ORDER — FENTANYL CITRATE 50 UG/ML
INJECTION, SOLUTION INTRAMUSCULAR; INTRAVENOUS
Status: DISPENSED
Start: 2022-01-01